# Patient Record
Sex: MALE | Race: WHITE | Employment: OTHER | URBAN - METROPOLITAN AREA
[De-identification: names, ages, dates, MRNs, and addresses within clinical notes are randomized per-mention and may not be internally consistent; named-entity substitution may affect disease eponyms.]

---

## 2017-03-10 ENCOUNTER — OFFICE VISIT (OUTPATIENT)
Dept: NEUROLOGY | Age: 72
End: 2017-03-10

## 2017-03-10 VITALS
HEIGHT: 68 IN | WEIGHT: 199 LBS | DIASTOLIC BLOOD PRESSURE: 60 MMHG | OXYGEN SATURATION: 95 % | SYSTOLIC BLOOD PRESSURE: 110 MMHG | BODY MASS INDEX: 30.16 KG/M2 | HEART RATE: 78 BPM

## 2017-03-10 NOTE — PATIENT INSTRUCTIONS
When the  calls, let them know that she would like to have your EEG done at the [facility] IN THE \[facility]\"" neurology office    If it is abnormal, we will call to discuss starting medication      [facility]   Statement to Patients  April 1, 2014      In an effort to ensure the large volume of patient prescription refills is processed in the most efficient and expeditious manner, we are asking our patients to assist us by calling your Pharmacy for all prescription refills, this will include also your  Mail Order Pharmacy. The pharmacy will contact our office electronically to continue the refill process. Please do not wait until the last minute to call your pharmacy. We need at least 48 hours (2days) to fill prescriptions. We also encourage you to call your pharmacy before going to  your prescription to make sure it is ready. With regard to controlled substance prescription refill requests (narcotic refills) that need to be picked up at our office, we ask your cooperation by providing us with at least 72 hours (3days) notice that you will need a refill. We will not refill narcotic prescription refill requests after 4:00pm on any weekday, Monday through Thursday, or after 2:00pm on Fridays, or on the weekends. We encourage everyone to explore another way of getting your prescription refill request processed using GradeFund, our patient web portal through our electronic medical record system. GradeFund is an efficient and effective way to communicate your medication request directly to the office and  downloadable as an sherman on your smart phone . GradeFund also features a review functionality that allows you to view your medication list as well as leave messages for your physician. Are you ready to get connected? If so please review the attatched instructions or speak to any of our staff to get you set up right away! Thank you so much for your cooperation.  Should you have any questions please contact our Practice Administrator. The Physicians and Staff,  [facility]          A Healthy Lifestyle: Care Instructions  Your Care Instructions  A healthy lifestyle can help you feel good, stay at a healthy weight, and have plenty of energy for both work and play. A healthy lifestyle is something you can share with your whole family. A healthy lifestyle also can lower your risk for serious health problems, such as high blood pressure, heart disease, and diabetes. You can follow a few steps listed below to improve your health and the health of your family. Follow-up care is a key part of your treatment and safety. Be sure to make and go to all appointments, and call your doctor if you are having problems. Its also a good idea to know your test results and keep a list of the medicines you take. How can you care for yourself at home? · Do not eat too much sugar, fat, or fast foods. You can still have dessert and treats now and then. The goal is moderation. · Start small to improve your eating habits. Pay attention to portion sizes, drink less juice and soda pop, and eat more fruits and vegetables. ¨ Eat a healthy amount of food. A 3-ounce serving of meat, for example, is about the size of a deck of cards. Fill the rest of your plate with vegetables and whole grains. ¨ Limit the amount of soda and sports drinks you have every day. Drink more water when you are thirsty. ¨ Eat at least 5 servings of fruits and vegetables every day. It may seem like a lot, but it is not hard to reach this goal. A serving or helping is 1 piece of fruit, 1 cup of vegetables, or 2 cups of leafy, raw vegetables. Have an apple or some carrot sticks as an afternoon snack instead of a candy bar. Try to have fruits and/or vegetables at every meal.  · Make exercise part of your daily routine. You may want to start with simple activities, such as walking, bicycling, or slow swimming.  Try to be active 30 to 60 minutes every day. You do not need to do all 30 to 60 minutes all at once. For example, you can exercise 3 times a day for 10 or 20 minutes. Moderate exercise is safe for most people, but it is always a good idea to talk to your doctor before starting an exercise program.  · Keep moving. Boyd Gut the lawn, work in the garden, or [place]. Take the stairs instead of the elevator at work. · If you smoke, quit. People who smoke have an increased risk for heart attack, stroke, cancer, and other lung illnesses. Quitting is hard, but there are ways to boost your chance of quitting tobacco for good. ¨ Use nicotine gum, patches, or lozenges. ¨ Ask your doctor about stop-smoking programs and medicines. ¨ Keep trying. In addition to reducing your risk of diseases in the future, you will notice some benefits soon after you stop using tobacco. If you have shortness of breath or asthma symptoms, they will likely get better within a few weeks after you quit. · Limit how much alcohol you drink. Moderate amounts of alcohol (up to 2 drinks a day for men, 1 drink a day for women) are okay. But drinking too much can lead to liver problems, high blood pressure, and other health problems. Family health  If you have a family, there are many things you can do together to improve your health. · Eat meals together as a family as often as possible. · Eat healthy foods. This includes fruits, vegetables, lean meats and dairy, and whole grains. · Include your family in your fitness plan. Most people think of activities such as jogging or tennis as the way to fitness, but there are many ways you and your family can be more active. Anything that makes you breathe hard and gets your heart pumping is exercise. Here are some tips:  ¨ Walk to do errands or to take your child to school or the bus. ¨ Go for a family bike ride after dinner instead of watching TV. Where can you learn more?   Go to http://randall-cy.info/. Enter M474 in the search box to learn more about \"A Healthy Lifestyle: Care Instructions. \"  Current as of: July 26, 2016  Content Version: 11.1  © 9498-5177 [employer]. Care instructions adapted under license by [employer] (which disclaims liability or warranty for this information). If you have questions about a medical condition or this instruction, always ask your healthcare professional. [place] [address] any warranty or liability for your use of this information.

## 2017-03-10 NOTE — PROGRESS NOTES
Date:            March 10, 2017    Name:  Sachin Nieto  :  1945  MRN:  [id number]     PCP:  No primary care provider on file. Chief Complaint   Patient presents with    Follow-up         HISTORY OF PRESENT ILLNESS:  Guille Diaz is a 66 y.o., male who presents today for follow up for indigestion. He developed significant GI symptoms in the late 90s. He reports that he woke up and was aware of his arms and legs in a way that he was not usually. This is always the first stage of his episodes, then his stomach will get sour. No vomiting, no diarrhea. Sometimes it goes up to his throat. Saw a GI doctor, who diagnosed him with IBS. Saw some other specialists who did not have an explanation. Saw an , that treatment did not help. He did some testing on himself, ultimately decided that it was brain related. He did notice that eye strain from a long drive or computer use could exacerbate his sour stomach. PCP put him on Cymbalta, spells were controlled until he had another one recently, about a month ago. It seemed to be right after he had a sinus infection. His stomach got very sour, this lasted about 3-4 days and then he went back to normal. He used to have similar symptoms related to over-exertion in the heat. No h/o head injuries, car accident, seizures. Patient denies depression or anxiety, although his wife feels that he might have some anxiety. Comprehensive ROS negative except as noted in HPI. 6.10.2015 shiva Irvin is a 68 y.o. male who is here today for the evaluation of above complaints. In  he had some paresthesia in limbs that was progressive and would come and go. He had some weird feeling in stomach too. He had extensive testing for GI and was told to have IBS. He spent a year with herbal medications which didn't help. He was given cymbalta and it did help him feel better. He also takes xanax and has not been able to stop it.  His symptoms of stomach weird feelings come back with more aggression. He has no other neurological issues to address. Current Outpatient Prescriptions   Medication Sig    DULoxetine (CYMBALTA) 30 mg capsule Take 60 mg by mouth daily. Takes 2 tabs at night     ALPRAZolam (XANAX) 0.5 mg tablet Take 0.5 mg by mouth. Takes 1 and 1/2 tabs a day    multivitamin (ONE A DAY) tablet Take 1 Tab by mouth daily.  lutein 6 mg tab Take  by mouth. No current facility-administered medications for this visit. Allergies not on file  Past Medical History:   Diagnosis Date    Vision decreased      Past Surgical History:   Procedure Laterality Date    HX GI      HX GYN       Social History     Social History    Marital status:      Spouse name: N/A    Number of children: N/A    Years of education: N/A     Occupational History    Not on file. Social History Main Topics    Smoking status: Never Smoker    Smokeless tobacco: Not on file    Alcohol use No    Drug use: No    Sexual activity: Not on file     Other Topics Concern    Not on file     Social History Narrative    No narrative on file     Family History   Problem Relation Age of Onset    Stroke Mother     No Known Problems Father          PHYSICAL EXAMINATION:    Visit Vitals    /60    Pulse 78    Ht 5' 8\" (1.727 m)    Wt 199 lb (90.3 kg)    SpO2 95%    BMI 30.26 kg/m2     General:  Well defined, nourished, and groomed individual in no acute distress. Neck: Supple, nontender, no bruits, no pain with resistance to active range of motion. Heart: Regular rate and rhythm, no murmurs, rub, or gallop. Normal S1S2. Lungs:  Clear to auscultation bilaterally with equal chest expansion, no cough, no wheeze  Musculoskeletal:  Extremities revealed no edema and had full range of motion of joints.     Psych:  Good mood and bright affect    NEUROLOGICAL EXAMINATION:     Mental Status:   Alert and oriented to person, place, and time with recent and remote memory intact. Attention span and concentration are normal. Speech is fluent with a full fund of knowledge. Cranial Nerves:    II, III, IV, VI:  Visual acuity grossly intact. Pupils are equal, round, and reactive to light. Extra-ocular movements are full and fluid. No ptosis or nystagmus. V-XII: Hearing is grossly intact. Facial features are symmetric, with normal sensation and strength. The palate rises symmetrically and the tongue protrudes midline. Sternocleidomastoids 5/5. Motor Examination: Normal tone, bulk, and strength, 5/5 muscle strength throughout. Coordination:  Finger to nose testing was normal.   No resting or intention tremor  Gait and Station:  Steady while walking and with tandem walking. Normal arm swing. No pronator drift. No muscle wasting or fasciculations noted. ASSESSMENT AND PLAN    ICD-10-CM ICD-9-CM    1. Spells R68.89 780.99 EEG     15-year-old male seen in follow-up for spells of odd feeling with indigestion. He first developed these in the late 90s, reports that he has a sour stomach and has an odd sensation in his arms and legs. Saw GI specialist who told him he had IBS. Saw an herbalist without improvement. Never did find the cause, but it has been well controlled on Cymbalta since 1999. He is concerned that he recently had another spell, wants to know if there is a newer medication may be better. Denies history of head injuries, strokes, seizures. Case discussed with Dr. Aden Vidal who agrees with the plan of care. 1.  We will get routine EEG to rule out the possibility that his spells are caused by seizure activity, discussed with him that we would likely want to start an AED if abnormal.  He can call for those results   2. Discussed with patient that the most likely explanation for his spells is somatization, encouraged him to follow with his PCP for other treatment options if Cymbalta is no longer working.    3.  May also need to see GI again to rule out other causes of his indigestion    Follow-up as needed, call for EEG results    Juliane Bar NP

## 2017-03-10 NOTE — MR AVS SNAPSHOT
Visit Information Date & Time Provider Department Dept. Phone Encounter #  
 3/10/2017  3:00 PM Elisabeth Carey NP Neurology Clinic at [facility] [phone] [id number] Follow-up Instructions Return if symptoms worsen or fail to improve. Upcoming Health Maintenance Date Due DTaP/Tdap/Td series (1 - Tdap) 9/25/1964 FOBT Q 1 YEAR AGE 50-75 9/25/1993 ZOSTER VACCINE AGE 60> 9/25/2003 GLAUCOMA SCREENING Q2Y 9/25/2008 Pneumococcal 65+ Low/Medium Risk (1 of 2 - PCV13) 9/25/2008 MEDICARE YEARLY EXAM 9/25/2008 INFLUENZA AGE 9 TO ADULT 8/1/2016 Allergies as of 3/10/2017  Review Complete On: 3/10/2017 By: Hiro Barry LPN No Known Allergies Current Immunizations  Never Reviewed No immunizations on file. Not reviewed this visit You Were Diagnosed With   
  
 Codes Comments Spells    -  Primary ICD-10-CM: R68.89 ICD-9-CM: 780.99 Vitals BP Pulse Height(growth percentile) Weight(growth percentile) SpO2 BMI  
 110/60 78 5' 8\" (1.727 m) 199 lb (90.3 kg) 95% 30.26 kg/m2 Smoking Status Never Smoker Vitals History BMI and BSA Data Body Mass Index Body Surface Area  
 30.26 kg/m 2 2.08 m 2 Your Updated Medication List  
  
   
This list is accurate as of: 3/10/17  3:53 PM.  Always use your most recent med list.  
  
  
  
  
 ALPRAZolam 0.5 mg tablet Commonly known as:  Ace Sa Take 0.5 mg by mouth. Takes 1 and 1/2 tabs a day CYMBALTA 30 mg capsule Generic drug:  DULoxetine Take 60 mg by mouth daily. Takes 2 tabs at night  
  
 lutein 6 mg Tab Take  by mouth.  
  
 multivitamin tablet Commonly known as:  ONE A DAY Take 1 Tab by mouth daily. Follow-up Instructions Return if symptoms worsen or fail to improve. To-Do List   
 03/11/2017 Neurology:  EEG Patient Instructions When the  calls, let them know that she would like to have your EEG done at the [facility] IN THE \[facility]\"" neurology office If it is abnormal, we will call to discuss starting medication PRESCRIPTION REFILL POLICY [facility] Statement to Patients April 1, 2014 In an effort to ensure the large volume of patient prescription refills is processed in the most efficient and expeditious manner, we are asking our patients to assist us by calling your Pharmacy for all prescription refills, this will include also your  Mail Order Pharmacy. The pharmacy will contact our office electronically to continue the refill process. Please do not wait until the last minute to call your pharmacy. We need at least 48 hours (2days) to fill prescriptions. We also encourage you to call your pharmacy before going to  your prescription to make sure it is ready. With regard to controlled substance prescription refill requests (narcotic refills) that need to be picked up at our office, we ask your cooperation by providing us with at least 72 hours (3days) notice that you will need a refill. We will not refill narcotic prescription refill requests after 4:00pm on any weekday, Monday through Thursday, or after 2:00pm on Fridays, or on the weekends. We encourage everyone to explore another way of getting your prescription refill request processed using Hers, our patient web portal through our electronic medical record system. Hers is an efficient and effective way to communicate your medication request directly to the office and  downloadable as an sherman on your smart phone . Hers also features a review functionality that allows you to view your medication list as well as leave messages for your physician. Are you ready to get connected? If so please review the attatched instructions or speak to any of our staff to get you set up right away! Thank you so much for your cooperation. Should you have any questions please contact our Practice Administrator. The Physicians and Staff,  [facility] A Healthy Lifestyle: Care Instructions Your Care Instructions A healthy lifestyle can help you feel good, stay at a healthy weight, and have plenty of energy for both work and play. A healthy lifestyle is something you can share with your whole family. A healthy lifestyle also can lower your risk for serious health problems, such as high blood pressure, heart disease, and diabetes. You can follow a few steps listed below to improve your health and the health of your family. Follow-up care is a key part of your treatment and safety. Be sure to make and go to all appointments, and call your doctor if you are having problems. Its also a good idea to know your test results and keep a list of the medicines you take. How can you care for yourself at home? · Do not eat too much sugar, fat, or fast foods. You can still have dessert and treats now and then. The goal is moderation. · Start small to improve your eating habits. Pay attention to portion sizes, drink less juice and soda pop, and eat more fruits and vegetables. ¨ Eat a healthy amount of food. A 3-ounce serving of meat, for example, is about the size of a deck of cards. Fill the rest of your plate with vegetables and whole grains. ¨ Limit the amount of soda and sports drinks you have every day. Drink more water when you are thirsty. ¨ Eat at least 5 servings of fruits and vegetables every day. It may seem like a lot, but it is not hard to reach this goal. A serving or helping is 1 piece of fruit, 1 cup of vegetables, or 2 cups of leafy, raw vegetables. Have an apple or some carrot sticks as an afternoon snack instead of a candy bar. Try to have fruits and/or vegetables at every meal. 
· Make exercise part of your daily routine.  You may want to start with simple activities, such as walking, bicycling, or slow swimming. Try to be active 30 to 60 minutes every day. You do not need to do all 30 to 60 minutes all at once. For example, you can exercise 3 times a day for 10 or 20 minutes. Moderate exercise is safe for most people, but it is always a good idea to talk to your doctor before starting an exercise program. 
· Keep moving. Carlos Cluck the lawn, work in the garden, or [place]. Take the stairs instead of the elevator at work. · If you smoke, quit. People who smoke have an increased risk for heart attack, stroke, cancer, and other lung illnesses. Quitting is hard, but there are ways to boost your chance of quitting tobacco for good. ¨ Use nicotine gum, patches, or lozenges. ¨ Ask your doctor about stop-smoking programs and medicines. ¨ Keep trying. In addition to reducing your risk of diseases in the future, you will notice some benefits soon after you stop using tobacco. If you have shortness of breath or asthma symptoms, they will likely get better within a few weeks after you quit. · Limit how much alcohol you drink. Moderate amounts of alcohol (up to 2 drinks a day for men, 1 drink a day for women) are okay. But drinking too much can lead to liver problems, high blood pressure, and other health problems. Family health If you have a family, there are many things you can do together to improve your health. · Eat meals together as a family as often as possible. · Eat healthy foods. This includes fruits, vegetables, lean meats and dairy, and whole grains. · Include your family in your fitness plan. Most people think of activities such as jogging or tennis as the way to fitness, but there are many ways you and your family can be more active. Anything that makes you breathe hard and gets your heart pumping is exercise. Here are some tips: 
¨ Walk to do errands or to take your child to school or the bus. ¨ Go for a family bike ride after dinner instead of watching TV. Where can you learn more? Go to http://randall-cy.info/. Enter F428 in the search box to learn more about \"A Healthy Lifestyle: Care Instructions. \" Current as of: July 26, 2016 Content Version: 11.1 © 6453-3984 [employer]X. Care instructions adapted under license by [employer] (which disclaims liability or warranty for this information). If you have questions about a medical condition or this instruction, always ask your healthcare professional. [address] any warranty or liability for your use of this information. Introducing [facility]! Ap Guido introduces Playground Sessions patient portal. Now you can access parts of your medical record, email your doctor's office, and request medication refills online. 1. In your internet browser, go to [email or web address] 2. Click on the First Time User? Click Here link in the Sign In box. You will see the New Member Sign Up page. 3. Enter your Playground Sessions Access Code exactly as it appears below. You will not need to use this code after youve completed the sign-up process. If you do not sign up before the expiration date, you must request a new code. · Playground Sessions Access Code: [email or web address] Expires: 6/8/2017  3:00 PM 
 
4. Enter the last four digits of your Social Security Number (xxxx) and Date of Birth (mm/dd/yyyy) as indicated and click Submit. You will be taken to the next sign-up page. 5. Create a Aylus Networkst ID. This will be your Playground Sessions login ID and cannot be changed, so think of one that is secure and easy to remember. 6. Create a Playground Sessions password. You can change your password at any time. 7. Enter your Password Reset Question and Answer. This can be used at a later time if you forget your password. 8. Enter your e-mail address.  You will receive e-mail notification when new information is available in Friendshippr. 9. Click Sign Up. You can now view and download portions of your medical record. 10. Click the Download Summary menu link to download a portable copy of your medical information. If you have questions, please visit the Frequently Asked Questions section of the Friendshippr website. Remember, Friendshippr is NOT to be used for urgent needs. For medical emergencies, dial 911. Now available from your iPhone and Android! Please provide this summary of care documentation to your next provider. If you have any questions after today's visit, please call [phone].

## 2017-03-21 ENCOUNTER — HOSPITAL ENCOUNTER (OUTPATIENT)
Dept: NEUROLOGY | Age: 72
Discharge: HOME | End: 2017-03-21
Attending: NURSE PRACTITIONER
Payer: MEDICARE

## 2017-03-21 DIAGNOSIS — R56.9 CONVULSIONS, UNSPECIFIED CONVULSION TYPE (HCC): ICD-10-CM

## 2017-03-21 DIAGNOSIS — R41.82 ALTERED MENTAL STATUS, UNSPECIFIED: ICD-10-CM

## 2017-03-21 PROCEDURE — 95816 EEG AWAKE AND DROWSY: CPT

## 2017-05-10 ENCOUNTER — TELEPHONE (OUTPATIENT)
Dept: NEUROLOGY | Age: 72
End: 2017-05-10

## 2017-05-10 NOTE — TELEPHONE ENCOUNTER
----- Message from Agustín Mendoza sent at 5/10/2017  9:37 AM EDT -----  Regarding: SAMUEL Maher/Telephone  Pt would like the results from the EEG. Best contact number is [phone].

## 2017-05-16 NOTE — TELEPHONE ENCOUNTER
Patient called back wondering if we figured out where he did his eeg testing. Expressed that we have no idea as there is nothing in his chart that suggests he had it done and per the last conversation with the nurse, he was supposed to figure out where he went and let us know. He still insists that he doesn't know. Wants to know what to do now. Gave him the number to the  dept in hopes that they will be able to point him in the right direction. Patient wants a call back.

## 2017-05-17 ENCOUNTER — TELEPHONE (OUTPATIENT)
Dept: NEUROLOGY | Age: 72
End: 2017-05-17

## 2017-05-17 PROBLEM — R41.82 ALTERED MENTAL STATUS, UNSPECIFIED: Status: ACTIVE | Noted: 2017-05-17

## 2017-05-17 PROBLEM — R56.9 CONVULSION (HCC): Status: ACTIVE | Noted: 2017-05-17

## 2017-05-17 NOTE — TELEPHONE ENCOUNTER
----- Message from Rachel Nieves sent at 5/16/2017 12:05 PM EDT -----  Regarding: SAMUEL Maher/Telephone  Pt, (P) [phone], advised that he had his EEG done at [address] [place] [address] and is inquiring on the results.

## 2017-05-17 NOTE — PROCEDURES
Yusuf Cohen [facility] [place] [facility]   [address] [facility], [address]   ROUTINE EEG REPORT       Name:  Shea Garcia   MR#:  [id number]   :  1945   Account #:  [de-identified]    Date of Procedure:     Date of Adm:  2017       DATE OF SERVICE: 2017      CLINICAL INDICATION: The patient is a 76-year-old male with a   history of spells of sudden abnormal abdominal discomfort then altered   mental status, slight episodes of not feeling well in his arms and legs. EEG to rule out seizures, rule out cortical abnormality. EEG CLASSIFICATION: Normal awake and drowsy. DESCRIPTION OF THE RECORD: The background of this recording   contains a posteriorly located occipital alpha rhythm of 9 to 10 Hz that   attenuates some with eye opening. Throughout the recording, there   were no clear areas of focal slowing or spike or spike-and-wave   discharges seen. The patient did enter brief states of drowsiness with   some generalized 2-6 Hz activity seen, but no clear areas of focal   slowing or spike or spike-and-wave discharges were seen. The patient   had photic stimulation performed that produced a mild driving response   in the posterior head regions. Hyperventilation was not performed. INTERPRETATION: This is an essentially normal   electroencephalogram with the patient awake and drowsy, showing no   clear focal abnormalities, spike discharges or recorded spells. Clinical   correlation recommended.         Mendel Feathers, MD TAS / Ashley.David   D:  2017   07:57   T:  2017   09:33   Job #:  [id number]

## 2022-03-19 PROBLEM — R56.9 CONVULSION (HCC): Status: ACTIVE | Noted: 2017-05-17

## 2022-03-20 PROBLEM — R41.82 ALTERED MENTAL STATUS, UNSPECIFIED: Status: ACTIVE | Noted: 2017-05-17

## 2022-05-26 ENCOUNTER — OFFICE VISIT (OUTPATIENT)
Dept: NEUROLOGY | Age: 77
End: 2022-05-26
Payer: MEDICARE

## 2022-05-26 VITALS
DIASTOLIC BLOOD PRESSURE: 64 MMHG | RESPIRATION RATE: 15 BRPM | HEART RATE: 77 BPM | OXYGEN SATURATION: 95 % | SYSTOLIC BLOOD PRESSURE: 112 MMHG

## 2022-05-26 DIAGNOSIS — R29.818 TRANSIENT NEUROLOGICAL SYMPTOMS: Primary | ICD-10-CM

## 2022-05-26 PROCEDURE — G8427 DOCREV CUR MEDS BY ELIG CLIN: HCPCS | Performed by: SPECIALIST

## 2022-05-26 PROCEDURE — 1101F PT FALLS ASSESS-DOCD LE1/YR: CPT | Performed by: SPECIALIST

## 2022-05-26 PROCEDURE — G8421 BMI NOT CALCULATED: HCPCS | Performed by: SPECIALIST

## 2022-05-26 PROCEDURE — G8536 NO DOC ELDER MAL SCRN: HCPCS | Performed by: SPECIALIST

## 2022-05-26 PROCEDURE — G8432 DEP SCR NOT DOC, RNG: HCPCS | Performed by: SPECIALIST

## 2022-05-26 PROCEDURE — 1123F ACP DISCUSS/DSCN MKR DOCD: CPT | Performed by: SPECIALIST

## 2022-05-26 PROCEDURE — 99204 OFFICE O/P NEW MOD 45 MIN: CPT | Performed by: SPECIALIST

## 2022-05-26 NOTE — PROGRESS NOTES
Neurology Consult      Subjective:      Amira Avalos is a 64 y.o. male who comes in today with what he called a 25-year problem. Said on July 26, 1995 he had this uncanny awareness about his arms and legs. This was followed by an uncomfortable [place] stomach feeling that would be there 24 hours a day. Said coincidentally and spontaneously that appetite and bowel movement were okay. He would also end up with his decreased awareness of his throat mechanics. Also found with driving long distances and computer work things would eventually settle down. He felt somewhat improved and saw a  in his encounters. Found that the drug Cymbalta and Xanax at the suggestion seem to make him feel better. Would notice that as he pursued activity his symptoms would get better and if he stopped being physically minded that his symptom complex will get worse? Currently notices that with his eyes and visual attention with driving and computer he can ultimately feel what he calls puny or fatigued. Says it can persist until he sleeps it off and by the next day he is okay. Is happy with his primary care and he has a trusted eye doctor at the [facility].  I see in the procedure tab that on March 21, 2017 he had an EEG read by Dr. Chirag Shelley awake and drowsy as normal and otherwise reassured. Suggested follow-up as needed. He says that at his current age he hopes he has plenty of years left and quality years at that. I see no reason to believe otherwise. Current Outpatient Medications   Medication Sig Dispense Refill    DULoxetine (CYMBALTA) 30 mg capsule Take 60 mg by mouth daily. Takes 2 tabs at night       ALPRAZolam (XANAX) 0.5 mg tablet Take 0.5 mg by mouth. Takes 1 and 1/2 tabs a day      multivitamin (ONE A DAY) tablet Take 1 Tab by mouth daily.  lutein 6 mg tab Take  by mouth.         No Known Allergies  Past Medical History:   Diagnosis Date    Vision decreased Past Surgical History:   Procedure Laterality Date    HX GI      HX GYN        Social History     Socioeconomic History    Marital status:      Spouse name: Not on file    Number of children: Not on file    Years of education: Not on file    Highest education level: Not on file   Occupational History    Not on file   Tobacco Use    Smoking status: Never Smoker    Smokeless tobacco: Not on file   Substance and Sexual Activity    Alcohol use: No    Drug use: No    Sexual activity: Not on file   Other Topics Concern    Not on file   Social History Narrative    Not on file     Social Determinants of Health     Financial Resource Strain:     Difficulty of Paying Living Expenses: Not on file   Food Insecurity:     Worried About Running Out of Food in the Last Year: Not on file    Tierra of Food in the Last Year: Not on file   Transportation Needs:     Lack of Transportation (Medical): Not on file    Lack of Transportation (Non-Medical):  Not on file   Physical Activity:     Days of Exercise per Week: Not on file    Minutes of Exercise per Session: Not on file   Stress:     Feeling of Stress : Not on file   Social Connections:     Frequency of Communication with Friends and Family: Not on file    Frequency of Social Gatherings with Friends and Family: Not on file    Attends Zoroastrianism Services: Not on file    Active Member of [address] [place] [address] Car reviews or Organizations: Not on file    Attends Club or Organization Meetings: Not on file    Marital Status: Not on file   Intimate Partner Violence:     Fear of Current or Ex-Partner: Not on file    Emotionally Abused: Not on file    Physically Abused: Not on file    Sexually Abused: Not on file   Housing Stability:     Unable to Pay for Housing in the Last Year: Not on file    Number of [place] in the Last Year: Not on file    Unstable Housing in the Last Year: Not on file      Family History   Problem Relation Age of Onset    Stroke Mother     No Known Problems Father       Visit Vitals  /64 (BP 1 Location: Left arm, BP Patient Position: Sitting, BP Cuff Size: Adult)   Pulse 77   Resp 15   SpO2 95%        Review of Systems:   A comprehensive review of systems was negative except for that written in the HPI. Neuro Exam:     Appearance: The patient is well developed, well nourished, provides a coherent history and is in no acute distress. Mental Status: Oriented to time, place and person. Mood and affect appropriate. Cranial Nerves:   Intact visual fields to static hand confrontation. Fundi are benign. OZIEL, EOM's full including smooth pursuit saccades vergence vestibulo-ocular and opticokinetic, no nystagmus, no ptosis. Facial sensation is normal. Corneal reflexes are intact. Facial movement is symmetric. Hearing is normal bilaterally. Palate is midline with normal sternocleidomastoid and trapezius muscles are normal. Tongue is midline. Visual acuity near with correction 20/20 OU. APD negative. Motor:  5/5 strength in upper and lower proximal and distal muscles. Normal bulk and tone. No fasciculations. Reflexes:   Deep tendon reflexes 2+/4 and symmetrical.   Sensory:   Normal to touch, pinprick and vibration. Gait:  Normal gait. Tremor:   No tremor noted. Cerebellar:  No cerebellar signs present. Neurovascular:  Normal heart sounds and regular rhythm, peripheral pulses intact, and no carotid bruits. Assessment:   Transient neurologic symptoms. Exam looks good and I have no paranoia or second-guessing regarding anything that we have missed with the dialogue today. For that reason I will not arbitrarily order tests, just because they exist.  I will suggest he keep his manfred in his primary care in the [address] [place] [address] as it goes to his eye care etc.  Been a pleasure meeting this gentleman today and I made no new discoveries or concerns that need to be pursued or followed up as of this encounter.     Plan:   Good luck and have a great Memorial Day weekend.   Signed by :  Jaydon Byrd MD

## 2022-05-26 NOTE — LETTER
5/26/2022    Patient: Wayne Pickering   YOB: 1945   Date of Visit: 5/26/2022     Cj Sinha MD  [address] [place] [id number]  Via Fax: [phone]    Dear Cj Sinha MD,      Thank you for referring Mr. Wayne Pickering to [address] [place] [address] for evaluation. My notes for this consultation are attached. If you have questions, please do not hesitate to call me. I look forward to following your patient along with you.       Sincerely,    Oren Jj MD

## 2022-05-26 NOTE — PATIENT INSTRUCTIONS
Patient history obtained patient examined. I hope that the patient feels reassured that I do not sense any professional concerns based on the shared information today. Continue to follow-up with Dr. Joel Weber and maintain that very important trusting follow-up with the eye doctor at the [facility].  I cannot think of any testing that I need to accomplish based on what I have seen in her today. Thanks for your service in the [place] [address] and have a great Memorial Day weekend and good luck.

## 2022-05-26 NOTE — PROGRESS NOTES
Issue started about 25 years ago  Eyes trigger something makes him feel under the weather   Double vision  Unsure if he has seen ophthalmologist

## 2022-10-10 ENCOUNTER — HOSPITAL ENCOUNTER (OUTPATIENT)
Age: 77
Setting detail: OUTPATIENT SURGERY
Discharge: HOME | End: 2022-10-10
Attending: INTERNAL MEDICINE | Admitting: INTERNAL MEDICINE
Payer: MEDICARE

## 2022-10-10 VITALS
OXYGEN SATURATION: 97 % | HEART RATE: 70 BPM | HEIGHT: 68 IN | SYSTOLIC BLOOD PRESSURE: 170 MMHG | BODY MASS INDEX: 30.26 KG/M2 | RESPIRATION RATE: 18 BRPM | DIASTOLIC BLOOD PRESSURE: 72 MMHG

## 2022-10-10 PROCEDURE — 2709999900 HC NON-CHARGEABLE SUPPLY: Performed by: INTERNAL MEDICINE

## 2022-10-10 PROCEDURE — 77030040810 HC CATH BLLN ANORECT EXPULSN MUIS -B: Performed by: INTERNAL MEDICINE

## 2022-10-10 PROCEDURE — 76040000007: Performed by: INTERNAL MEDICINE

## 2022-10-10 RX ORDER — ROSUVASTATIN CALCIUM 20 MG/1
20 TABLET, COATED ORAL
COMMUNITY

## 2022-10-10 NOTE — PROGRESS NOTES
Rectal exam done by Mane Soriano LPN. Anal manometry catheter inserted into rectum. Manometry procedure complete. Catheter inserted into rectum. Balloon filled with 40cc of luke warm H2O, and pt escorted to bathroom for 5 min expulsion test.  Pt was not able to expel balloon. Balloon deflated and catheter removed. Pt tolerated procedures well.

## 2022-10-10 NOTE — DISCHARGE INSTRUCTIONS
Nidhi Ambriz  [id number]  1945      MANOMETRY DISCHARGE INSTRUCTION    You may resume your regular diet as tolerated. You may resume your normal daily activities. Call your Physician if you have any complications or questions. People to Remember Activation    Thank you for requesting access to People to Remember. Please follow the instructions below to securely access and download your online medical record. People to Remember allows you to send messages to your doctor, view your test results, renew your prescriptions, schedule appointments, and more. How Do I Sign Up? In your internet browser, go to [email or web address]  Click on the First Time User? Click Here link in the Sign In box. You will be redirect to the New Member Sign Up page. Enter your People to Remember Access Code exactly as it appears below. You will not need to use this code after youve completed the sign-up process. If you do not sign up before the expiration date, you must request a new code. People to Remember Access Code: [email or web address]  Expires: 11/3/2022 11:02 AM (This is the date your People to Remember access code will )    Enter the last four digits of your Social Security Number (xxxx) and Date of Birth (mm/dd/yyyy) as indicated and click Submit. You will be taken to the next sign-up page. Create a People to Remember ID. This will be your People to Remember login ID and cannot be changed, so think of one that is secure and easy to remember. Create a People to Remember password. You can change your password at any time. Enter your Password Reset Question and Answer. This can be used at a later time if you forget your password. Enter your e-mail address. You will receive e-mail notification when new information is available in [address] Click Sign Up. You can now view and download portions of your medical record. Click the [employer] link to download a portable copy of your medical information.     Additional Information    If you have questions, please visit the Frequently Asked Questions section of the [employer] website at [email or web address] Remember, [employer] is NOT to be used for urgent needs. For medical emergencies, dial 911.

## 2022-10-24 NOTE — PROCEDURES
Male Anorectal Manometry Procedure Note     Procedure Date: 10/10/22     Referring Provider: DR Tali Baker     Operation/Procedure: Anorectal Manometry/Rectal Sensation/Tone     Indication: Stool incontinence, Hemorrhoidectomy, Chronic constipation     Description of the Operation/Procedure:A 12-sensor high resolution solid state manometry probe with a 4cm long balloon was placed through the anus into the rectum. When correctly positioned, the pressure sensors were located 1cm apart from the anal margin and the balloon at 7-11 cm. After correct placement, the probe was taped to the perineum. After a run in period of 5 minutes, the patient was asked to perform anal squeeze maneuvers twice, and bearing down maneuvers. Thereafter, intermittent rectal balloon distention was performed to assess rectoanal reflexes, rectal sensation, and rectal compliance by distending the balloon in a step wise manner. After this, the probe was removed. We then placed a 50cc water filled balloon in the rectum and the patient was asked to expel this device in privacy on a commode. Findings:  (Normal mean and 95% Confidence Interval shown in parenthesis)       Anal Sphincter Pressures:  Maximum resting pressure    71.9 mmHg (72, 64-80)   Maximum squeeze pressure    228 mmHg (193, 175-211)   Duration of squeeze    9.2 seconds   Straining maneuver rectal pressure    35.6 mmHg (68, 58-78),   Straining maneuver anal residual pressure    63.7 mmHg (49, 35-63)   Defecation index    0.55 (2.2, 1.4-3.0)   Sphincter length    3.6 cm (4, 3.8-4.2)   Dyssynergic defecation    Yes         Rectoanal Reflexes: The rectoanal inhibitory reflex was:  Present (on initial balloon fill)         Rectal Sensation: The patient reported  First sensation    60 cc (20, 15-25)   Desire to defecate    120 cc (109, )   Urgency    200 cc (186, 152-218)          Balloon Expulsion Test:  Able to expel a 40cc balloon in 5 minute?     No      Impression: According to the 2019 [facility] Classifications and the [address] [place] [address] Manometric Tests of Anorectal Function in Healthy Adults:  1) There is no manometric evidence to support rectoanal hyporeflexia or areflexia. 2) There is borderline normal resting tone and borderline elevated contractility of the anal sphincter, of likely low clinical significance. 3) There is manometric evidence to support dyssynergic defecation; there is abnormally low intrarectal pressure in the presence of abnormal balloon expulsion. Anal relaxation is borderline inadequate, therefore push maneuvers indicate abnormal expulsion with poor propulsion and dyssynergia. 4) There is equivocal manometric evidence to support rectal hyposensitivity. Recommendation:   1) Recommend pelvic Floor PT with biofeedback therapy for defecatory dyssynergia.

## 2023-01-10 ENCOUNTER — HOSPITAL ENCOUNTER (OUTPATIENT)
Age: 78
Setting detail: OUTPATIENT SURGERY
Discharge: HOME | End: 2023-01-10
Attending: INTERNAL MEDICINE | Admitting: INTERNAL MEDICINE
Payer: MEDICARE

## 2023-01-10 ENCOUNTER — ANESTHESIA (OUTPATIENT)
Dept: ENDOSCOPY | Age: 78
End: 2023-01-10
Payer: MEDICARE

## 2023-01-10 ENCOUNTER — ANESTHESIA EVENT (OUTPATIENT)
Dept: ENDOSCOPY | Age: 78
End: 2023-01-10
Payer: MEDICARE

## 2023-01-10 VITALS
RESPIRATION RATE: 15 BRPM | OXYGEN SATURATION: 97 % | HEIGHT: 68 IN | HEART RATE: 67 BPM | BODY MASS INDEX: 28.49 KG/M2 | SYSTOLIC BLOOD PRESSURE: 139 MMHG | TEMPERATURE: 98 F | WEIGHT: 188 LBS | DIASTOLIC BLOOD PRESSURE: 67 MMHG

## 2023-01-10 PROCEDURE — 74011250636 HC RX REV CODE- 250/636: Performed by: INTERNAL MEDICINE

## 2023-01-10 PROCEDURE — 74011000250 HC RX REV CODE- 250: Performed by: ANESTHESIOLOGY

## 2023-01-10 PROCEDURE — 74011250636 HC RX REV CODE- 250/636: Performed by: ANESTHESIOLOGY

## 2023-01-10 PROCEDURE — 76060000032 HC ANESTHESIA 0.5 TO 1 HR: Performed by: INTERNAL MEDICINE

## 2023-01-10 PROCEDURE — 76040000007: Performed by: INTERNAL MEDICINE

## 2023-01-10 PROCEDURE — 2709999900 HC NON-CHARGEABLE SUPPLY: Performed by: INTERNAL MEDICINE

## 2023-01-10 RX ORDER — EPINEPHRINE 0.1 MG/ML
1 INJECTION INTRACARDIAC; INTRAVENOUS
Status: DISCONTINUED | OUTPATIENT
Start: 2023-01-10 | End: 2023-01-10 | Stop reason: HOSPADM

## 2023-01-10 RX ORDER — NALOXONE HYDROCHLORIDE 0.4 MG/ML
0.4 INJECTION, SOLUTION INTRAMUSCULAR; INTRAVENOUS; SUBCUTANEOUS
Status: DISCONTINUED | OUTPATIENT
Start: 2023-01-10 | End: 2023-01-10 | Stop reason: HOSPADM

## 2023-01-10 RX ORDER — FLUMAZENIL 0.1 MG/ML
0.2 INJECTION INTRAVENOUS
Status: DISCONTINUED | OUTPATIENT
Start: 2023-01-10 | End: 2023-01-10 | Stop reason: HOSPADM

## 2023-01-10 RX ORDER — SODIUM CHLORIDE 9 MG/ML
125 INJECTION, SOLUTION INTRAVENOUS CONTINUOUS
Status: DISCONTINUED | OUTPATIENT
Start: 2023-01-10 | End: 2023-01-10 | Stop reason: HOSPADM

## 2023-01-10 RX ORDER — MIDAZOLAM HYDROCHLORIDE 1 MG/ML
.25-5 INJECTION, SOLUTION INTRAMUSCULAR; INTRAVENOUS
Status: DISCONTINUED | OUTPATIENT
Start: 2023-01-10 | End: 2023-01-10 | Stop reason: HOSPADM

## 2023-01-10 RX ORDER — ATROPINE SULFATE 0.1 MG/ML
0.5 INJECTION INTRAVENOUS
Status: DISCONTINUED | OUTPATIENT
Start: 2023-01-10 | End: 2023-01-10 | Stop reason: HOSPADM

## 2023-01-10 RX ORDER — PROPOFOL 10 MG/ML
INJECTION, EMULSION INTRAVENOUS AS NEEDED
Status: DISCONTINUED | OUTPATIENT
Start: 2023-01-10 | End: 2023-01-10 | Stop reason: HOSPADM

## 2023-01-10 RX ORDER — DEXTROMETHORPHAN/PSEUDOEPHED 2.5-7.5/.8
1.2 DROPS ORAL
Status: DISCONTINUED | OUTPATIENT
Start: 2023-01-10 | End: 2023-01-10 | Stop reason: HOSPADM

## 2023-01-10 RX ORDER — SODIUM CHLORIDE 9 MG/ML
25 INJECTION, SOLUTION INTRAVENOUS CONTINUOUS
Status: DISCONTINUED | OUTPATIENT
Start: 2023-01-10 | End: 2023-01-10 | Stop reason: HOSPADM

## 2023-01-10 RX ORDER — DIPHENHYDRAMINE HYDROCHLORIDE 50 MG/ML
50 INJECTION, SOLUTION INTRAMUSCULAR; INTRAVENOUS ONCE
Status: DISCONTINUED | OUTPATIENT
Start: 2023-01-10 | End: 2023-01-10 | Stop reason: HOSPADM

## 2023-01-10 RX ORDER — LIDOCAINE HYDROCHLORIDE 20 MG/ML
INJECTION, SOLUTION EPIDURAL; INFILTRATION; INTRACAUDAL; PERINEURAL AS NEEDED
Status: DISCONTINUED | OUTPATIENT
Start: 2023-01-10 | End: 2023-01-10 | Stop reason: HOSPADM

## 2023-01-10 RX ADMIN — LIDOCAINE HYDROCHLORIDE 40 MG: 20 INJECTION, SOLUTION EPIDURAL; INFILTRATION; INTRACAUDAL; PERINEURAL at 09:01

## 2023-01-10 RX ADMIN — SODIUM CHLORIDE 25 ML/HR: 9 INJECTION, SOLUTION INTRAVENOUS at 08:21

## 2023-01-10 RX ADMIN — PROPOFOL 150 MG: 10 INJECTION, EMULSION INTRAVENOUS at 09:26

## 2023-01-10 NOTE — ANESTHESIA PREPROCEDURE EVALUATION
Relevant Problems   No relevant active problems       Anesthetic History   No history of anesthetic complications            Review of Systems / Medical History  Patient summary reviewed, nursing notes reviewed and pertinent labs reviewed    Pulmonary  Within defined limits                 Neuro/Psych   Within defined limits           Cardiovascular                  Exercise tolerance: >4 METS     GI/Hepatic/Renal  Within defined limits              Endo/Other        Arthritis     Other Findings              Physical Exam    Airway  Mallampati: II  TM Distance: > 6 cm  Neck ROM: normal range of motion   Mouth opening: Normal     Cardiovascular    Rhythm: regular  Rate: normal         Dental  No notable dental hx       Pulmonary  Breath sounds clear to auscultation               Abdominal  GI exam deferred       Other Findings            Anesthetic Plan    ASA: 2  Anesthesia type: MAC          Induction: Intravenous  Anesthetic plan and risks discussed with: Patient

## 2023-01-10 NOTE — ANESTHESIA POSTPROCEDURE EVALUATION
Procedure(s):  COLONOSCOPY.    MAC, total IV anesthesia    Anesthesia Post Evaluation        Patient location during evaluation: PACU  Note status: Adequate. Level of consciousness: responsive to verbal stimuli and sleepy but conscious  Pain management: satisfactory to patient  Airway patency: patent  Anesthetic complications: no  Cardiovascular status: acceptable  Respiratory status: acceptable  Hydration status: acceptable  Comments: +Post-Anesthesia Evaluation and Assessment    Patient: Carrie Soto MRN: [id number]  SSN: [id number]   YOB: 1945  Age: 76 y.o. Sex: male      Cardiovascular Function/Vital Signs    /67   Pulse 67   Temp 36.7 °C (98 °F)   Resp 15   Ht 5' 8\" (1.727 m)   Wt 85.3 kg (188 lb)   SpO2 97%   BMI 28.59 kg/m²     Patient is status post Procedure(s):  COLONOSCOPY. Nausea/Vomiting: Controlled. Postoperative hydration reviewed and adequate. Pain:  Pain Scale 1: Numeric (0 - 10) (01/10/23 0954)  Pain Intensity 1: 0 (01/10/23 0954)   Managed. Neurological Status: At baseline. Mental Status and Level of Consciousness: Arousable. Pulmonary Status:   O2 Device: None (Room air) (01/10/23 0954)   Adequate oxygenation and airway patent. Complications related to anesthesia: None    Post-anesthesia assessment completed. No concerns. Signed By: Shala Crawford DO    1/10/2023  Post anesthesia nausea and vomiting:  controlled      INITIAL Post-op Vital signs:   Vitals Value Taken Time   /67 01/10/23 0954   Temp 36.7 °C (98 °F) 01/10/23 0939   Pulse 64 01/10/23 0954   Resp 17 01/10/23 0954   SpO2 97 % 01/10/23 0954   Vitals shown include unvalidated device data.

## 2023-01-10 NOTE — DISCHARGE INSTRUCTIONS
Mccrary Griffin  [id number]  1945    It was my pleasure seeing you for your procedure. You will also receive a summary report with the findings from this procedure and any further recommendations. If you had polyps removed or biopsies taken during your procedure, you will receive a separate letter from me within the next 2 weeks. If you don't receive this letter or if you have any questions, please call my office [phone]. Please take note of the post procedure instructions listed below. Best Wishes,    Dr. Francy Morin    These instructions give you information on caring for yourself after your procedure. Call your doctor if you have any problems or questions after your procedure. HOME CARE  Walk if you have belly cramping or gas. Walking will help get rid of the air and reduce the bloated feeling in your belly (abdomen). Your IV site (where you received drugs) may be tender to touch. Place warm towels on the site; keep your arm up on two pillows if you have any swelling or soreness in the area. You may shower. ACTIVITY:  Take frequent rest periods and move at a slower pace for the next 24 hours. .  You may resume your regular activity tomorrow if you are feeling back to normal.  Do not drive or ride a bicycle for at least 24 hours (because of the medicine (anesthesia) used during the test). Do not sign any important legal documents or use or operate any machinery for 24 hours  Do not take sleeping medicines/nerve drugs for 24 hours unless the doctor tells you. You can return to work/school tomorrow unless otherwise instructed. NUTRITION:  Drink plenty of fluids to keep your pee (urine) clear or pale yellow  Begin with a light meal and progress to your normal diet. Heavy or fried foods are harder to digest and may make you feel sick to your stomach (nauseated).   Once you are feeling back to normal, you may resume your normal diet as instructed by your doctor. Avoid alcoholic beverages for 24 hours or as instructed. IF YOU HAD BIOPSIES TAKEN OR POLYPS REMOVED DURING THE PROCEDURE:  For the next 7 days, avoid all non-steroidal antiinflammatory medications such as Ibuprofen, Motrin, Advil, Alleve, Lisa-seltzer, Goody's powder, BC powder. If you do not have an heart condition that requires you to take a daily aspirin, you should avoid taking aspirin for 7 days. Eat a soft diet for 24 hours. Monitor your stools for any blood or dark black, tar-like, stools as this may be a sign of bleeding and if you see any blood, notify your doctor immediately. GET HELP RIGHT AWAY AND SEEK IMMEDIATE MEDICAL CARE IF:  You have more than a spotting of blood in your stool. You pass clumps of tissue (blood clots) or fill the toilet with blood. Your belly is painfully swollen or puffy (abdominal distention). You throw up (vomit). You have a fever. You have redness, pain or swelling at the IV site that last greater than two days. You have abdominal pain or discomfort that is severe or gets worse throughout the day. Post-procedure diagnosis:  Colon: anal polyp    Post-procedure recommendations:  - Repeat colonoscopy based on path of rectal / anal polyp  - discussed with Vaishnavi. Learning About Coronavirus [phone])  Coronavirus [phone]): Overview  What is coronavirus (COVID-19)? The coronavirus disease (COVID-19) is caused by a virus. It is an illness that was first found in [place], [place], in December 2019. It has since spread worldwide. The virus can cause fever, cough, and trouble breathing. In severe cases, it can cause pneumonia and make it hard to breathe without help. It can cause death. Coronaviruses are a large group of viruses. They cause the common cold. They also cause more serious illnesses like Middle East respiratory syndrome (MERS) and severe acute respiratory syndrome (SARS).  COVID-19 is caused by a novel coronavirus. That means it's a new type that has not been seen in people before. This virus spreads person-to-person through droplets from coughing and sneezing. It can also spread when you are close to someone who is infected. And it can spread when you touch something that has the virus on it, such as a doorknob or a tabletop. What can you do to protect yourself from coronavirus (COVID-19)? The best way to protect yourself from getting sick is to: Avoid areas where there is an outbreak. Avoid contact with people who may be infected. Wash your hands often with soap or alcohol-based hand sanitizers. Avoid crowds and try to stay at least 6 feet away from other people. Wash your hands often, especially after you cough or sneeze. Use soap and water, and scrub for at least 20 seconds. If soap and water aren't available, use an alcohol-based hand . Avoid touching your mouth, nose, and eyes. What can you do to avoid spreading the virus to others? To help avoid spreading the virus to others:  Cover your mouth with a tissue when you cough or sneeze. Then throw the tissue in the trash. Use a disinfectant to clean things that you touch often. Stay home if you are sick or have been exposed to the virus. Don't go to school, work, or public areas. And don't use public transportation. If you are sick:  Leave your home only if you need to get medical care. But call the doctor's office first so they know you're coming. And wear a face mask, if you have one. If you have a face mask, wear it whenever you're around other people. It can help stop the spread of the virus when you cough or sneeze. Clean and disinfect your home every day. Use household  and disinfectant wipes or sprays. Take special care to clean things that you grab with your hands. These include doorknobs, remote controls, phones, and handles on your refrigerator and microwave.  And don't forget countertops, tabletops, bathrooms, and computer keyboards. When to call for help  Call 911 anytime you think you may need emergency care. For example, call if:  You have severe trouble breathing. (You can't talk at all.)  You have constant chest pain or pressure. You are severely dizzy or lightheaded. You are confused or can't think clearly. Your face and lips have a blue color. You pass out (lose consciousness) or are very hard to wake up. Call your doctor now if you develop symptoms such as:  Shortness of breath. Fever. Cough. If you need to get care, call ahead to the doctor's office for instructions before you go. Make sure you wear a face mask, if you have one, to prevent exposing other people to the virus. Where can you get the latest information? The following health organizations are tracking and studying this virus. Their websites contain the most up-to-date information. Emilia Ramirez also learn what to do if you think you may have been exposed to the virus. U.S. Centers for Disease Control and Prevention (CDC): The CDC provides updated news about the disease and travel advice. The website also tells you how to prevent the spread of infection. www.cdc.gov  World Health Organization [facility]): WHO offers information about the virus outbreaks. WHO also has travel advice. www.who.int  Current as of: April 1, 2020               Content Version: 12.4  © 1408-1652 [employer]. Care instructions adapted under license by your healthcare professional. If you have questions about a medical condition or this instruction, always ask your healthcare professional. [address] any warranty or liability for your use of this information.        Patient Education on Sedation / Analgesia Administered for Procedure      For 24 hours after general anesthesia or intravenous analgesia / sedation:  Have someone responsible help you with your care  Limit your activities  Do not drive and operate hazardous machinery  Do not make important personal, legal or business decisions  Do not drink alcoholic beverages  If you have not urinated within 8 hours after discharge, please contact your physician  Resume your medications unless otherwise instructed    For 24 hours after general anesthesia or intravenous analgesia / sedation  you may experience:  Drowsiness, dizziness, sleepiness, or confusion  Difficulty remembering or delayed reaction times  Difficulty with your balance, especially while walking, move slowly and carefully, do not make sudden position changes  Difficulty focusing or blurred vision    You may not be aware of slight changes in your behavior and/or your reaction time because of the medication used during and after your procedure.     Report the following to your physician:  Excessive pain, swelling, redness or odor of or around the surgical area  Temperature over 100.5  Nausea and vomiting lasting longer than 4 hours or if unable to take medications  Any signs of decreased circulation or nerve impairment to extremity: change in color, persistent numbness, tingling, coldness or increase pain  Any questions or concerns    IF YOU REPORT TO AN EMERGENCY ROOM, DOCTOR'S OFFICE OR HOSPITAL WITHIN 24 HOURS AFTER YOUR PROCEDURE, BRING THIS SHEET AND YOUR AFTER VISIT SUMMARY WITH YOU AND GIVE IT TO THE PHYSICIAN OR NURSE ATTENDING YOU.

## 2023-01-10 NOTE — ROUTINE PROCESS
Jessica Adis  1945  [id number]    Situation:  Verbal report received from: TEJAS Frazier  Procedure: Procedure(s):  COLONOSCOPY    Background:    Preoperative diagnosis: SCREENING, CONSTIPATION  Postoperative diagnosis: Colon: anal polyp    :  Dr. Marcos Pollack RN  Assistant(s): Endoscopy Technician-1: Harsha Brooks  Endoscopy RN-1: Karin Navarro RN    Specimens: * No specimens in log *  H. Pylori  no    Assessment:  Intra-procedure medications    Anesthesia gave intra-procedure sedation and medications, see anesthesia flow sheet yes    Intravenous fluids: NS@ KVO     Vital signs stable     Abdominal assessment: round and soft     Recommendation:  Discharge patient per MD order.   Family or Friend   Permission to share finding with family or friend yes

## 2023-01-10 NOTE — PROCEDURES
Colonoscopy Procedure Note    Indications:   See Preoperative Diagnosis above  Referring Physician: Rena Andrew MD  Anesthesia/Sedation: MAC anesthesia Propofol  Endoscopist:  Dr. Sravanthi Rodriguez  Assistant:  Endoscopy Technician-1: Kayy Ridley  Endoscopy RN-1: Karin Navarro RN  Preoperative diagnosis: SCREENING, CONSTIPATION  Postoperative diagnosis: Colon:     Procedure in Detail:  Informed consent was obtained for the procedure, including sedation. Risks of perforation, hemorrhage, adverse drug reaction, and aspiration were discussed. The patient was placed in the left lateral decubitus position. Based on the pre-procedure assessment, including review of the patient's medical history, medications, allergies, and review of systems, he had been deemed to be an appropriate candidate for moderate sedation; he was therefore sedated with the medications listed above. The patient was monitored continuously with ECG tracing, pulse oximetry, blood pressure monitoring, and direct observations. A rectal examination was performed. The [id number] was inserted into the rectum and advanced under direct vision to the cecum, which was identified by the ileocecal valve and appendiceal orifice. The quality of the colonic preparation was good BPS 2/2/2 6 after lavage. A careful inspection was made as the colonoscope was withdrawn, including a retroflexed view of the rectum; findings and interventions are described below. Appropriate photodocumentation was obtained. Findings:  DELL: small polyp palpated  Rectum: 10-15mm adenomatous polyp appreciated within the anal canal, seen on retroflexion as well. Not biopsied as it appeared to be at / below the dentate line. Will need examination and resection by colorectal surgery.   Sigmoid: normal  no mucosal lesion appreciated  Descending Colon: normal  no mucosal lesion appreciated  Transverse Colon: normal  no mucosal lesion appreciated  Ascending Colon: normal  no mucosal lesion appreciated  Cecum: normal  no mucosal lesion appreciated  Terminal Ileum: not intubated    EBL: none    Complications: None; patient tolerated the procedure well. Recommendations:   - Repeat colonoscopy based on path of rectal / anal polyp  - discussed with Vaishnavi.       Signed By: Radha Dillon MD                        January 10, 2023

## 2023-02-23 RX ORDER — NAPROXEN SODIUM 220 MG
440 TABLET ORAL 2 TIMES DAILY WITH MEALS
COMMUNITY

## 2023-02-23 RX ORDER — FAMOTIDINE 10 MG/1
20 TABLET ORAL EVERY MORNING
COMMUNITY

## 2023-02-23 RX ORDER — POLYETHYLENE GLYCOL 3350 17 G/17G
17 POWDER, FOR SOLUTION ORAL 3 TIMES DAILY
COMMUNITY

## 2023-02-23 RX ORDER — ASPIRIN 81 MG/1
81 TABLET ORAL DAILY
COMMUNITY

## 2023-02-23 NOTE — PERIOP NOTES
[facility]  Preoperative Instructions        Surgery Date Tuesday, 2/28          Time of Arrival TBD/TBC @ [phone]    1. On the day of your surgery, please report to the Surgical Services Registration Desk and sign in at your designated time. The Surgery Center is located to the right of the Emergency Room. 2. You must have someone with you to drive you home. You should not drive a car for 24 hours following surgery. Please make arrangements for a friend or family member to stay with you for the first 24 hours after your surgery. 3. Do not have anything to eat or drink (including water, gum, mints, coffee, juice) after midnight Monday, 2/27. ? This may not apply to medications prescribed by your physician. ?(Please note below the special instructions with medications to take the morning of your procedure.)    4. We recommend you do not drink any alcoholic beverages for 24 hours before and after your surgery. 5. Contact your surgeons office for instructions on the following medications: non-steroidal anti-inflammatory drugs (i.e. Advil, Aleve), vitamins, and supplements. (Some surgeons will want you to stop these medications prior to surgery and others may allow you to take them)  **If you are currently taking Plavix, Coumadin, Aspirin and/or other blood-thinning agents, contact your surgeon for instructions. ** Your surgeon will partner with the physician prescribing these medications to determine if it is safe to stop or if you need to continue taking. Please do not stop taking these medications without instructions from your surgeon    6. Wear comfortable clothes. Wear glasses instead of contacts. Do not bring any money or jewelry. Please bring picture ID, insurance card, and any prearranged co-payment or hospital payment. Do not wear make-up, particularly mascara the morning of your surgery. Do not wear nail polish, particularly if you are having foot /hand surgery. Wear your hair loose or down, no ponytails, buns, calixto pins or clips. All body piercings must be removed. Please shower with antibacterial soap for three consecutive days before and on the morning of surgery, but do not apply any lotions, powders or deodorants after the shower on the day of surgery. Please use a fresh towels after each shower. Please sleep in clean clothes and change bed linens the night before surgery. Please do not shave for 48 hours prior to surgery. Shaving of the face is acceptable. 7. You should understand that if you do not follow these instructions your surgery may be cancelled. If your physical condition changes (I.e. fever, cold or flu) please contact your surgeon as soon as possible. 8. It is important that you be on time. If a situation occurs where you may be late, please call [phone] (OR Holding Area). 9. If you have any questions and or problems, please call [phone] (Pre-admission Testing). 10. Your surgery time may be subject to change. You will receive a phone call the evening prior if your time changes. 11.  If having outpatient surgery, you must have someone to drive you here, stay with you during the duration of your stay, and to drive you home at time of discharge. TAKE ALL MEDICATIONS DAY OF SURGERY EXCEPT: Follow your physician/surgeon instructions for holding all non-steroidal anti-inflammatory drugs/NSAIDs (Aleve), blood-thinning agents (aspirin), vitamins & supplements prior to surgery. I understand a pre-operative phone call will be made to verify my surgery time. In the event that I am not available, I give permission for a message to be left on my answering service and/or with another person?   yes         ___________________      __________   _________    (Signature of Patient)             (Witness)                (Date and Time)

## 2023-02-28 ENCOUNTER — HOSPITAL ENCOUNTER (OUTPATIENT)
Age: 78
Setting detail: OUTPATIENT SURGERY
Discharge: HOME | End: 2023-02-28
Attending: SURGERY | Admitting: SURGERY
Payer: MEDICARE

## 2023-02-28 ENCOUNTER — ANESTHESIA (OUTPATIENT)
Dept: SURGERY | Age: 78
End: 2023-02-28
Payer: MEDICARE

## 2023-02-28 ENCOUNTER — ANESTHESIA EVENT (OUTPATIENT)
Dept: SURGERY | Age: 78
End: 2023-02-28
Payer: MEDICARE

## 2023-02-28 VITALS
RESPIRATION RATE: 9 BRPM | BODY MASS INDEX: 29.58 KG/M2 | DIASTOLIC BLOOD PRESSURE: 65 MMHG | HEART RATE: 69 BPM | WEIGHT: 188.49 LBS | SYSTOLIC BLOOD PRESSURE: 134 MMHG | TEMPERATURE: 97.4 F | OXYGEN SATURATION: 93 % | HEIGHT: 67 IN

## 2023-02-28 DIAGNOSIS — R52 PAIN: Primary | ICD-10-CM

## 2023-02-28 PROCEDURE — 76010000138 HC OR TIME 0.5 TO 1 HR: Performed by: SURGERY

## 2023-02-28 PROCEDURE — 76210000016 HC OR PH I REC 1 TO 1.5 HR: Performed by: SURGERY

## 2023-02-28 PROCEDURE — 77030013079 HC BLNKT BAIR HGGR 3M -A

## 2023-02-28 PROCEDURE — 76060000032 HC ANESTHESIA 0.5 TO 1 HR: Performed by: SURGERY

## 2023-02-28 PROCEDURE — 74011000250 HC RX REV CODE- 250

## 2023-02-28 PROCEDURE — 77030008684 HC TU ET CUF COVD -B

## 2023-02-28 PROCEDURE — 77030031139 HC SUT VCRL2 J&J -A: Performed by: SURGERY

## 2023-02-28 PROCEDURE — 74011250637 HC RX REV CODE- 250/637: Performed by: SURGERY

## 2023-02-28 PROCEDURE — 74011250636 HC RX REV CODE- 250/636: Performed by: STUDENT IN AN ORGANIZED HEALTH CARE EDUCATION/TRAINING PROGRAM

## 2023-02-28 PROCEDURE — 88305 TISSUE EXAM BY PATHOLOGIST: CPT

## 2023-02-28 PROCEDURE — 77030002996 HC SUT SLK J&J -A: Performed by: SURGERY

## 2023-02-28 PROCEDURE — 77030039825 HC MSK NSL PAP SUPERNO2VA VYRM -B: Performed by: ANESTHESIOLOGY

## 2023-02-28 PROCEDURE — 74011250636 HC RX REV CODE- 250/636

## 2023-02-28 PROCEDURE — 76210000020 HC REC RM PH II FIRST 0.5 HR: Performed by: SURGERY

## 2023-02-28 PROCEDURE — 74011000250 HC RX REV CODE- 250: Performed by: SURGERY

## 2023-02-28 PROCEDURE — 2709999900 HC NON-CHARGEABLE SUPPLY: Performed by: SURGERY

## 2023-02-28 PROCEDURE — 77030026438 HC STYL ET INTUB CARD -A

## 2023-02-28 RX ORDER — EPHEDRINE SULFATE/0.9% NACL/PF 50 MG/5 ML
SYRINGE (ML) INTRAVENOUS AS NEEDED
Status: DISCONTINUED | OUTPATIENT
Start: 2023-02-28 | End: 2023-02-28 | Stop reason: HOSPADM

## 2023-02-28 RX ORDER — DEXAMETHASONE SODIUM PHOSPHATE 4 MG/ML
INJECTION, SOLUTION INTRA-ARTICULAR; INTRALESIONAL; INTRAMUSCULAR; INTRAVENOUS; SOFT TISSUE AS NEEDED
Status: DISCONTINUED | OUTPATIENT
Start: 2023-02-28 | End: 2023-02-28 | Stop reason: HOSPADM

## 2023-02-28 RX ORDER — ONDANSETRON 2 MG/ML
4 INJECTION INTRAMUSCULAR; INTRAVENOUS AS NEEDED
Status: DISCONTINUED | OUTPATIENT
Start: 2023-02-28 | End: 2023-02-28 | Stop reason: HOSPADM

## 2023-02-28 RX ORDER — PROPOFOL 10 MG/ML
INJECTION, EMULSION INTRAVENOUS AS NEEDED
Status: DISCONTINUED | OUTPATIENT
Start: 2023-02-28 | End: 2023-02-28 | Stop reason: HOSPADM

## 2023-02-28 RX ORDER — DEXMEDETOMIDINE HYDROCHLORIDE 100 UG/ML
INJECTION, SOLUTION INTRAVENOUS AS NEEDED
Status: DISCONTINUED | OUTPATIENT
Start: 2023-02-28 | End: 2023-02-28 | Stop reason: HOSPADM

## 2023-02-28 RX ORDER — FENTANYL CITRATE 50 UG/ML
50 INJECTION, SOLUTION INTRAMUSCULAR; INTRAVENOUS AS NEEDED
Status: CANCELLED | OUTPATIENT
Start: 2023-02-28

## 2023-02-28 RX ORDER — OXYCODONE HYDROCHLORIDE 5 MG/1
5 TABLET ORAL
Qty: 20 TABLET | Refills: 0 | Status: SHIPPED | OUTPATIENT
Start: 2023-02-28 | End: 2023-03-03

## 2023-02-28 RX ORDER — SUCCINYLCHOLINE CHLORIDE 20 MG/ML
INJECTION INTRAMUSCULAR; INTRAVENOUS AS NEEDED
Status: DISCONTINUED | OUTPATIENT
Start: 2023-02-28 | End: 2023-02-28 | Stop reason: HOSPADM

## 2023-02-28 RX ORDER — SODIUM CHLORIDE, SODIUM LACTATE, POTASSIUM CHLORIDE, CALCIUM CHLORIDE 600; 310; 30; 20 MG/100ML; MG/100ML; MG/100ML; MG/100ML
25 INJECTION, SOLUTION INTRAVENOUS CONTINUOUS
Status: DISCONTINUED | OUTPATIENT
Start: 2023-02-28 | End: 2023-02-28 | Stop reason: HOSPADM

## 2023-02-28 RX ORDER — FENTANYL CITRATE 50 UG/ML
INJECTION, SOLUTION INTRAMUSCULAR; INTRAVENOUS AS NEEDED
Status: DISCONTINUED | OUTPATIENT
Start: 2023-02-28 | End: 2023-02-28 | Stop reason: HOSPADM

## 2023-02-28 RX ORDER — SODIUM CHLORIDE, SODIUM LACTATE, POTASSIUM CHLORIDE, CALCIUM CHLORIDE 600; 310; 30; 20 MG/100ML; MG/100ML; MG/100ML; MG/100ML
25 INJECTION, SOLUTION INTRAVENOUS CONTINUOUS
Status: CANCELLED | OUTPATIENT
Start: 2023-02-28 | End: 2023-03-01

## 2023-02-28 RX ORDER — BUPIVACAINE HYDROCHLORIDE AND EPINEPHRINE 2.5; 5 MG/ML; UG/ML
INJECTION, SOLUTION EPIDURAL; INFILTRATION; INTRACAUDAL; PERINEURAL AS NEEDED
Status: DISCONTINUED | OUTPATIENT
Start: 2023-02-28 | End: 2023-02-28 | Stop reason: HOSPADM

## 2023-02-28 RX ORDER — LIDOCAINE HYDROCHLORIDE 10 MG/ML
0.1 INJECTION, SOLUTION EPIDURAL; INFILTRATION; INTRACAUDAL; PERINEURAL AS NEEDED
Status: CANCELLED | OUTPATIENT
Start: 2023-02-28

## 2023-02-28 RX ORDER — ONDANSETRON 2 MG/ML
INJECTION INTRAMUSCULAR; INTRAVENOUS AS NEEDED
Status: DISCONTINUED | OUTPATIENT
Start: 2023-02-28 | End: 2023-02-28 | Stop reason: HOSPADM

## 2023-02-28 RX ORDER — PHENYLEPHRINE HCL IN 0.9% NACL 0.4MG/10ML
SYRINGE (ML) INTRAVENOUS AS NEEDED
Status: DISCONTINUED | OUTPATIENT
Start: 2023-02-28 | End: 2023-02-28 | Stop reason: HOSPADM

## 2023-02-28 RX ORDER — FENTANYL CITRATE 50 UG/ML
25 INJECTION, SOLUTION INTRAMUSCULAR; INTRAVENOUS
Status: DISCONTINUED | OUTPATIENT
Start: 2023-02-28 | End: 2023-02-28 | Stop reason: HOSPADM

## 2023-02-28 RX ORDER — LIDOCAINE HYDROCHLORIDE 20 MG/ML
INJECTION, SOLUTION EPIDURAL; INFILTRATION; INTRACAUDAL; PERINEURAL AS NEEDED
Status: DISCONTINUED | OUTPATIENT
Start: 2023-02-28 | End: 2023-02-28 | Stop reason: HOSPADM

## 2023-02-28 RX ORDER — HYDROMORPHONE HYDROCHLORIDE 1 MG/ML
.2-.5 INJECTION, SOLUTION INTRAMUSCULAR; INTRAVENOUS; SUBCUTANEOUS
Status: DISCONTINUED | OUTPATIENT
Start: 2023-02-28 | End: 2023-02-28 | Stop reason: HOSPADM

## 2023-02-28 RX ORDER — MIDAZOLAM HYDROCHLORIDE 1 MG/ML
1 INJECTION, SOLUTION INTRAMUSCULAR; INTRAVENOUS AS NEEDED
Status: CANCELLED | OUTPATIENT
Start: 2023-02-28

## 2023-02-28 RX ADMIN — LIDOCAINE HYDROCHLORIDE 100 MG: 20 INJECTION, SOLUTION EPIDURAL; INFILTRATION; INTRACAUDAL; PERINEURAL at 12:22

## 2023-02-28 RX ADMIN — ONDANSETRON HYDROCHLORIDE 4 MG: 2 INJECTION, SOLUTION INTRAMUSCULAR; INTRAVENOUS at 12:57

## 2023-02-28 RX ADMIN — PROPOFOL 100 MG: 10 INJECTION, EMULSION INTRAVENOUS at 12:39

## 2023-02-28 RX ADMIN — DEXMEDETOMIDINE HYDROCHLORIDE 4 MCG: 100 INJECTION, SOLUTION, CONCENTRATE INTRAVENOUS at 12:35

## 2023-02-28 RX ADMIN — PROPOFOL 50 MG: 10 INJECTION, EMULSION INTRAVENOUS at 12:28

## 2023-02-28 RX ADMIN — DEXAMETHASONE SODIUM PHOSPHATE 4 MG: 4 INJECTION, SOLUTION INTRAMUSCULAR; INTRAVENOUS at 12:35

## 2023-02-28 RX ADMIN — PROPOFOL 200 MG: 10 INJECTION, EMULSION INTRAVENOUS at 12:22

## 2023-02-28 RX ADMIN — FENTANYL CITRATE 50 MCG: 50 INJECTION, SOLUTION INTRAMUSCULAR; INTRAVENOUS at 12:28

## 2023-02-28 RX ADMIN — SODIUM CHLORIDE, POTASSIUM CHLORIDE, SODIUM LACTATE AND CALCIUM CHLORIDE: 600; 310; 30; 20 INJECTION, SOLUTION INTRAVENOUS at 12:18

## 2023-02-28 RX ADMIN — Medication 80 MCG: at 12:48

## 2023-02-28 RX ADMIN — FENTANYL CITRATE 50 MCG: 50 INJECTION, SOLUTION INTRAMUSCULAR; INTRAVENOUS at 12:40

## 2023-02-28 RX ADMIN — Medication 80 MCG: at 12:50

## 2023-02-28 RX ADMIN — SUCCINYLCHOLINE CHLORIDE 170 MG: 20 INJECTION, SOLUTION INTRAMUSCULAR; INTRAVENOUS at 12:23

## 2023-02-28 RX ADMIN — Medication 5 MG: at 12:46

## 2023-02-28 RX ADMIN — FENTANYL CITRATE 50 MCG: 50 INJECTION, SOLUTION INTRAMUSCULAR; INTRAVENOUS at 12:22

## 2023-02-28 RX ADMIN — Medication 3 AMPULE: at 09:44

## 2023-02-28 RX ADMIN — SODIUM CHLORIDE, POTASSIUM CHLORIDE, SODIUM LACTATE AND CALCIUM CHLORIDE 25 ML/HR: 600; 310; 30; 20 INJECTION, SOLUTION INTRAVENOUS at 09:43

## 2023-02-28 NOTE — ANESTHESIA PREPROCEDURE EVALUATION
Relevant Problems   NEUROLOGY   (+) Convulsion (HCC)       Anesthetic History   No history of anesthetic complications            Review of Systems / Medical History  Patient summary reviewed, nursing notes reviewed and pertinent labs reviewed    Pulmonary  Within defined limits                 Neuro/Psych   Within defined limits           Cardiovascular                  Exercise tolerance: >4 METS     GI/Hepatic/Renal  Within defined limits              Endo/Other        Arthritis     Other Findings   Comments: History of Factor V Leiden           Physical Exam    Airway  Mallampati: II  TM Distance: > 6 cm  Neck ROM: normal range of motion   Mouth opening: Normal     Cardiovascular    Rhythm: regular  Rate: normal         Dental  No notable dental hx       Pulmonary  Breath sounds clear to auscultation               Abdominal  GI exam deferred       Other Findings            Anesthetic Plan    ASA: 2  Anesthesia type: general          Induction: Intravenous  Anesthetic plan and risks discussed with: Patient

## 2023-02-28 NOTE — BRIEF OP NOTE
Brief Postoperative Note    Patient: Daniel Phelan  YOB: 1945  MRN: [id number]    Date of Procedure: 2/28/2023     Pre-Op Diagnosis: ANAL POLYP    Post-Op Diagnosis:  Anal mass       Procedure(s):  TRANSANAL EXCISION OF ANAL MASS    Surgeon(s):  Carey Doss MD    Surgical Assistant: Surg Asst-1: Elisabeth Mott RN    Anesthesia: General     Estimated Blood Loss (mL): Minimal    Complications: None    Specimens:   ID Type Source Tests Collected by Time Destination   1 : Anal mass Preservative Anus  Carey Doss MD 2/28/2023 1247 Pathology        Implants: * No implants in log *    Drains: * No LDAs found *    Findings: Submucosal anal mass    Electronically Signed by Marjorie Lucas MD on 2/28/2023 at 12:53 PM

## 2023-02-28 NOTE — PERIOP NOTES
1425  Discharge brochure provided; Reviewed DC instructions with patient. Opportunity for questions and clarifications was provided; verbalized understanding. Follow-up appointments reviewed/written for patient. Pt stable and ambulatory for discharge; Patient's wife, Ev Mann, to provide transportation to home. Clarified all personal belongings sent with patient. IV removed (without difficulty/pt tolerated well) Telemetry discontinued. [phone] Patient discharged home with his wife, no concerns voiced at time of discharge.

## 2023-02-28 NOTE — DISCHARGE INSTRUCTIONS
SEE DISCHARGE INSTRUCTIONS FOR DR Jodie Corrales AMBULATORY PATIENTS                DISCHARGE SUMMARY from Nurse    PATIENT INSTRUCTIONS:    After general anesthesia or intravenous sedation, for 24 hours or while taking prescription narcotics:    Have someone responsible help you with your care  Limit your activities  Do not drive and operate hazardous machinery  Do not make important personal, legal or business decisions  Do not drink alcoholic beverages  If you have not urinated within 8 hours after discharge, please contact your surgeon on call  Resume your medications unless otherwise instructed    From general anesthesia, intravenous sedation, or while taking prescription narcotics, you may experience:    Drowsiness, dizziness, sleepiness, or confusion  Difficulty remembering or delayed reaction times  Difficulty with your balance, especially while walking, move slowly and carefully, do not make sudden position changes  Difficulty focusing or blurred vision    You may not be aware of slight changes in your behavior and/or your reaction time because of the medication used during and after your procedure. Report the following to your surgeon:  Excessive pain, swelling, redness or odor of or around the surgical area  Temperature over 100.5  Nausea and vomiting lasting longer than 4 hours or if unable to take medications  Any signs of decreased circulation or nerve impairment to extremity: change in color, persistent numbness, tingling, coldness or increase pain  Any questions or concerns         IF YOU REPORT TO AN EMERGENCY ROOM, DOCTOR'S OFFICE OR HOSPITAL WITHIN 24 HOURS AFTER YOUR PROCEDURE, BRING THIS SHEET AND YOUR AFTER VISIT SUMMARY WITH YOU AND GIVE IT TO THE PHYSICIAN OR NURSE ATTENDING YOU. These are general instructions for a healthy lifestyle (if applicable):     No smoking/ No tobacco products/ Avoid exposure to secondhand smoke  Surgeon General's Warning:  Quitting smoking now greatly reduces serious risk to your health. Obesity, smoking, and sedentary lifestyle greatly increases your risk for illness    A healthy diet, regular physical exercise & weight monitoring are important for maintaining a healthy lifestyle    You may be retaining fluid if you have a history of heart failure or if you experience any of the following symptoms:  Weight gain of 3 pounds or more overnight or 5 pounds in a week, increased swelling in our hands or feet or shortness of breath while lying flat in bed. Please call your doctor as soon as you notice any of these symptoms; do not wait until your next office visit. A common side effect of anesthesia following surgery is nausea and/or vomiting. In order to decrease symptoms, it is wise to avoid foods that are high in fat, greasy foods, milk products, and spicy foods for the first 24 hours. Acceptable foods for the first 24 hours following surgery include but are not limited to:    soup  broth  toast   crackers   applesauce  bananas   mashed potatoes,  soft or scrambled eggs  oatmeal  jello    It is important to eat when taking your pain medication. This will help to prevent nausea. If possible, please try to time your meals with your medications. It is very important to stay hydrated following surgery. Sip fluids frequently while awake. Avoid acidic drinks such as citrus juices and soda for 24 hours. Carbonated beverages may cause bloating and gas. Acceptable fluids include:    water (flavor packets may add variety)  coffee or tea (in moderation)  Gatorade  Caden-Aid  apple juice  cranberry juice    You are encouraged to cough and deep breathe every hour when awake. This will help to prevent respiratory complications following anesthesia. You may want to hug a pillow when coughing and sneezing to add additional support to the surgical area and to decrease discomfort if you had abdominal or chest surgery.     If you are discharged home with support stockings, you may remove them after 24 hours. Support stockings are used to help prevent blood clots in the legs following surgery. TO PREVENT AN INFECTION      [address] YOUR HANDS    To prevent infection, good handwashing is the most important thing you or your caregiver can do. Wash your hands with soap and water or use the hand  we gave you before you touch any wounds. SHOWER    Use the antibacterial soap we gave you when you take a shower. Shower with this soap until your wounds are healed. To reach all areas of your body, you may need someone to help you. Dont forget to clean your belly button with every shower. USE CLEAN SHEETS    Use freshly cleaned sheets on your bed after surgery. To keep the surgery site clean, do not allow pets to sleep with you while your wound is still healing. STOP SMOKING    Stop smoking, or at least cut back on smoking    Smoking slows your healing. CONTROL YOUR BLOOD SUGAR    High blood sugars slow wound healing. If you are diabetic, control your blood sugar levels before and after your surgery. Please take time to review all of your Home Care Instructions and Medication Information sheets provided in your discharge packet. If you have any questions, please contact your surgeon's office. Thank you. The discharge information has been reviewed with the patient and instruction recipient. The patient and instruction recipient verbalized understanding. Discharge medications reviewed with the patient and instruction recipient and appropriate educational materials and side effects teaching were provided. Please provide this summary of care documentation to your next provider. Oxycodone, Rapid Release (ETH-Oxydose, Oxy IR, Roxicodone) - (By mouth)   Why this medicine is used:   Treats moderate to severe pain. This medicine is a narcotic pain reliever. Contact a nurse or doctor right away if you have:   Fast or slow heart beat, shallow breathing, blue lips, skin or fingernails  Anxiety, restlessness, fever, sweating, muscle spasms, twitching, seeing or hearing things that are not there  Extreme weakness, shallow breathing, slow heartbeat  Severe confusion, lightheadedness, dizziness, fainting  Sweating or cold, clammy skin, seizures  Severe constipation, stomach pain, nausea, vomiting     Common side effects:  Mild constipation  Sleepiness, tiredness  © 2017 [facility] Information is for End User's use only and may not be sold, redistributed or otherwise used for commercial purposes.

## 2023-02-28 NOTE — ANESTHESIA POSTPROCEDURE EVALUATION
Procedure(s):  TRANSANAL EXCISION OF ANAL POLYP.    general    Anesthesia Post Evaluation      Multimodal analgesia: multimodal analgesia used between 6 hours prior to anesthesia start to PACU discharge  Patient location during evaluation: PACU  Patient participation: complete - patient participated  Level of consciousness: sleepy but conscious and responsive to verbal stimuli  Pain score: 2  Pain management: adequate  Airway patency: patent  Anesthetic complications: no  Cardiovascular status: acceptable  Respiratory status: acceptable  Hydration status: acceptable  Comments: +Post-Anesthesia Evaluation and Assessment    Patient: Daniel Phelan MRN: [id number]  SSN: [id number]   YOB: 1945  Age: 76 y.o. Sex: male      Cardiovascular Function/Vital Signs    BP (!) 134/55   Pulse 80   Temp 37 °C (98.6 °F)   Resp 15   Ht 5' 7\" (1.702 m)   Wt 85.5 kg (188 lb 7.9 oz)   SpO2 99%   BMI 29.52 kg/m²     Patient is status post Procedure(s):  TRANSANAL EXCISION OF ANAL POLYP. Nausea/Vomiting: Controlled. Postoperative hydration reviewed and adequate. Pain:  Pain Scale 1: Numeric (0 - 10) (02/28/23 1315)  Pain Intensity 1: 0 (02/28/23 1315)   Managed. Neurological Status:   Neuro (WDL): Exceptions to WDL (02/28/23 1315)   At baseline. Mental Status and Level of Consciousness: Arousable. Pulmonary Status:   O2 Device: Nasal cannula (02/28/23 1317)   Adequate oxygenation and airway patent. Complications related to anesthesia: None    Post-anesthesia assessment completed. No concerns.     Signed By: Aren Cortes MD    2/28/2023  Post anesthesia nausea and vomiting:  controlled  Final Post Anesthesia Temperature Assessment:  Normothermia (36.0-37.5 degrees C)      INITIAL Post-op Vital signs:   Vitals Value Taken Time   /55 02/28/23 1320   Temp 37 °C (98.6 °F) 02/28/23 1315   Pulse 74 02/28/23 1326   Resp 14 02/28/23 1326   SpO2 98 % 02/28/23 1326   Vitals shown include unvalidated device data.

## 2023-02-28 NOTE — PROGRESS NOTES
TRANSFER - IN REPORT:    Verbal report received from OR nurse and LUKASZ Lopez CRNA(name) on [employer]  being received from OR(unit) for routine post - op      Report consisted of patients Situation, Background, Assessment and   Recommendations(SBAR). Information from the following report(s) SBAR, Kardex, OR Summary, and MAR was reviewed with the receiving nurse. Opportunity for questions and clarification was provided. Assessment completed upon patients arrival to unit and care assumed.

## 2023-03-01 NOTE — OP NOTES
[address]  OPERATIVE REPORT    Name:  Vamshi Chua  MR#:  [id number]  :  1945  ACCOUNT #:  [de-identified]  DATE OF SERVICE:  2023      PREOPERATIVE DIAGNOSIS:  Anal mass. POSTOPERATIVE DIAGNOSIS:  Anal mass. PROCEDURE PERFORMED:  Transanal excision of anal mass. SURGEON:  Gianna Rose MD    ASSISTANT:  Kang Cotto    ANESTHESIA:  General.    COMPLICATIONS:  None. SPECIMENS REMOVED:  Anal mass. IMPLANTS:  None. ESTIMATED BLOOD LOSS:  Minimal.    DRAINS:  None. FINDINGS:  Submucosal anal mass. DESCRIPTION OF PROCEDURE:  The patient was brought to the operating suite, placed in the supine position, general endotracheal anesthesia was induced. He was then placed in the prone jackknife position. His buttocks were tapped apart. The perianal area was prepped and draped in the usual sterile fashion. Time-out was performed indicating the correct patient and procedure to be performed as per hospital protocol. The patient was noted to have a 2.5 cm submucosal mass in the left anterior anal canal extending to the distal rectum. A circular incision was made encompassing the entirety of the mass. This was dissected down to the submucosa in a full thickness fashion and in to the muscularis propria of the rectum. The mass was excised from its surrounding tissues taking care to avoid any injury to the underlying sphincter muscles as much as possible. The mass was felt to be quite firm that appeared to be mostly submucosal.  After undermining the mass, it was excised en bloc and sent to Pathology for further examination. Hemostasis was achieved with a combination of electrocautery and 2-0 Vicryl sutures. After hemostasis was achieved, 30 mL of 0.25% Marcaine with epinephrine was injected subcutaneously. The patient was awaken and taken to the recovery room in stable condition.         MD EDWINA Mosley/[email or web address]  D:  2023 12:57  T:  02/28/2023 23:15  JOB #:  [id number]

## 2023-03-06 ENCOUNTER — TRANSCRIBE ORDER (OUTPATIENT)
Dept: SCHEDULING | Age: 78
End: 2023-03-06

## 2023-03-06 DIAGNOSIS — D49.0: Primary | ICD-10-CM

## 2023-03-06 DIAGNOSIS — D49.0 SPLEEN NEOPLASM: Primary | ICD-10-CM

## 2023-03-08 ENCOUNTER — HOSPITAL ENCOUNTER (OUTPATIENT)
Dept: CT IMAGING | Age: 78
Discharge: HOME | End: 2023-03-08
Attending: SURGERY
Payer: MEDICARE

## 2023-03-08 DIAGNOSIS — D49.0: ICD-10-CM

## 2023-03-08 PROCEDURE — 74011000636 HC RX REV CODE- 636: Performed by: SURGERY

## 2023-03-08 PROCEDURE — 74177 CT ABD & PELVIS W/CONTRAST: CPT

## 2023-03-08 RX ADMIN — IOPAMIDOL 100 ML: 755 INJECTION, SOLUTION INTRAVENOUS at 13:36

## 2023-03-14 ENCOUNTER — HOSPITAL ENCOUNTER (OUTPATIENT)
Dept: MRI IMAGING | Age: 78
Discharge: HOME | End: 2023-03-14
Attending: SURGERY
Payer: MEDICARE

## 2023-03-14 DIAGNOSIS — D49.0 SPLEEN NEOPLASM: ICD-10-CM

## 2023-03-14 PROCEDURE — 72197 MRI PELVIS W/O & W/DYE: CPT

## 2023-03-14 PROCEDURE — A9576 INJ PROHANCE MULTIPACK: HCPCS | Performed by: RADIOLOGY

## 2023-03-14 PROCEDURE — 74011250636 HC RX REV CODE- 250/636: Performed by: RADIOLOGY

## 2023-03-14 RX ADMIN — GADOTERIDOL 17 ML: 279.3 INJECTION, SOLUTION INTRAVENOUS at 15:51

## 2023-03-22 ENCOUNTER — OFFICE VISIT (OUTPATIENT)
Dept: ONCOLOGY | Age: 78
End: 2023-03-22
Payer: MEDICARE

## 2023-03-22 VITALS
HEART RATE: 75 BPM | TEMPERATURE: 97.8 F | RESPIRATION RATE: 17 BRPM | WEIGHT: 188 LBS | HEIGHT: 67 IN | SYSTOLIC BLOOD PRESSURE: 123 MMHG | OXYGEN SATURATION: 98 % | DIASTOLIC BLOOD PRESSURE: 65 MMHG | BODY MASS INDEX: 29.51 KG/M2

## 2023-03-22 DIAGNOSIS — C20 RECTAL ADENOCARCINOMA (HCC): Primary | ICD-10-CM

## 2023-03-22 PROCEDURE — G8510 SCR DEP NEG, NO PLAN REQD: HCPCS | Performed by: STUDENT IN AN ORGANIZED HEALTH CARE EDUCATION/TRAINING PROGRAM

## 2023-03-22 PROCEDURE — G8427 DOCREV CUR MEDS BY ELIG CLIN: HCPCS | Performed by: STUDENT IN AN ORGANIZED HEALTH CARE EDUCATION/TRAINING PROGRAM

## 2023-03-22 PROCEDURE — G8417 CALC BMI ABV UP PARAM F/U: HCPCS | Performed by: STUDENT IN AN ORGANIZED HEALTH CARE EDUCATION/TRAINING PROGRAM

## 2023-03-22 PROCEDURE — 1101F PT FALLS ASSESS-DOCD LE1/YR: CPT | Performed by: STUDENT IN AN ORGANIZED HEALTH CARE EDUCATION/TRAINING PROGRAM

## 2023-03-22 PROCEDURE — 99205 OFFICE O/P NEW HI 60 MIN: CPT | Performed by: STUDENT IN AN ORGANIZED HEALTH CARE EDUCATION/TRAINING PROGRAM

## 2023-03-22 PROCEDURE — 1123F ACP DISCUSS/DSCN MKR DOCD: CPT | Performed by: STUDENT IN AN ORGANIZED HEALTH CARE EDUCATION/TRAINING PROGRAM

## 2023-03-22 PROCEDURE — G0463 HOSPITAL OUTPT CLINIC VISIT: HCPCS | Performed by: STUDENT IN AN ORGANIZED HEALTH CARE EDUCATION/TRAINING PROGRAM

## 2023-03-22 PROCEDURE — G8536 NO DOC ELDER MAL SCRN: HCPCS | Performed by: STUDENT IN AN ORGANIZED HEALTH CARE EDUCATION/TRAINING PROGRAM

## 2023-03-22 RX ORDER — DICLOFENAC SODIUM 10 MG/G
GEL TOPICAL
COMMUNITY
Start: 2022-09-19

## 2023-03-22 RX ORDER — MELOXICAM 15 MG/1
TABLET ORAL
COMMUNITY
Start: 2022-12-29

## 2023-03-22 NOTE — PROGRESS NOTES
[facility] [address] [facility] [address]  W: [phone] F: [phone]      Reason for Visit:   Nawaf Pineda is a 76 y.o. male who is seen in consultation at the request of Dr. Bernie Mijares for evaluation of rectal adenocarcinoma. Hematology / Oncology Treatment History:     Hematological/Oncological Diagnosis: Rectal adenocarcinoma cStage 1, T2N0M0  Date of Diagnosis:     Treatment course: pending    Pathology:   ==========================================================================   * * *FINAL PATHOLOGIC DIAGNOSIS* * *     Anal mass, transanal mucosal resection:        Invasive adenocarcinoma with mucinous features, arising in a   tubulovillous adenoma. See synoptic report and comment.      COLON AND RECTUM: Resection, Including Transanal Disk Excision of      Rectal Neoplasms    SPECIMEN       Procedure: Transanal disk excision (local excision)    TUMOR       Tumor Site: Rectum       Histologic Type: Adenocarcinoma with mucinous features       Histologic Grade: G2: Moderately differentiated       Tumor Size: 2.3 cm       Tumor Extension: Tumor invades muscularis propria       Macroscopic Tumor Perforation: Not identified       Lymphovascular Invasion: Present       Perineural Invasion: Not identified       Treatment Effect: No known presurgical therapy    MARGINS       Margins: Transanal Disk Excision Specimen           Deep Margin: Involved by invasive carcinoma           Mucosal Margin: Involved by invasive carcinoma; distal tip and   right and left lateral margins               Status of Non-Invasive Tumor at Mucosal Margin:    LYMPH NODES       Regional Lymph Nodes: No lymph nodes submitted or found       Tumor Deposits: Not identified    PATHOLOGIC STAGE CLASSIFICATION (pTNM, AJCC 8th Edition)       Primary Tumor (pT): pT2       Regional Lymph Nodes (pN): pNX       Results-Comments IMMUNOHISTOCHEMISTRY - DNA Mismatch Repair Protein Expression     Results:   MLH-1      No loss of expression             Extent: 3+; Intensity: Strong       MSH-2      No loss of expression             Extent: 3+; Intensity: Strong       MSH-6      No loss of expression             Extent: 3+; Intensity: Strong     PMS2           No loss of expression             Extent: 3+; Intensity: Strong       History of Present Illness:     Very pleasant 64-year-old male with a relevant medical history most significant for factor V Leiden, arthritis, anxiety, presents for evaluation and treatment of recently discovered rectal adenocarcinoma that was surgically resected with a transanal disc excision on February 28, 2023. Unfortunately, the patient had residual margins that were positive as well as evidence of lymphovascular invasion. Clinically, the patient denies any significant symptoms of any residual rectal pain or rectal bleeding. On interview, the patient has a good functional status reporting that he ambulates without limitation throughout the day, and is responsible for all of his own activities of daily living. He is active and works out every few days. He denies any bone pain, abdominal pain, severe constipation, diarrhea, blood in his stool, or any other constitutional symptoms of be concerning for systemic disease. Family history and social history reviewed, noncontributory    Review of Systems: A complete review of systems was obtained, negative except as described above.     Past Medical History:   Diagnosis Date    Arthritis     Factor V Leiden ([address]     High cholesterol     History of colon polyps       Past Surgical History:   Procedure Laterality Date    COLONOSCOPY N/A 1/10/2023    COLONOSCOPY performed by Diana Kidd MD at [facility] ENDOSCOPY    HX GI      HX HEMORRHOIDECTOMY      HX ORTHOPAEDIC      toe    HX TONSILLECTOMY      HX WISDOM TEETH EXTRACTION        Social History     Tobacco Use Smoking status: Never    Smokeless tobacco: Never   Substance Use Topics    Alcohol use: No      Family History   Problem Relation Age of Onset    Stroke Mother      Current Outpatient Medications   Medication Sig    diclofenac (VOLTAREN) 1 % gel APPLY 2 GM TO AFFECTED AREA FOUR TIMES A DAY AS NEEDED FOR PAIN    aspirin delayed-release 81 mg tablet Take 81 mg by mouth daily. psyllium husk (METAMUCIL PO) Take  by mouth three (3) times daily. polyethylene glycol (MIRALAX) 17 gram/dose powder Take 17 g by mouth three (3) times daily. naproxen sodium (NAPROSYN) 220 mg tablet Take 440 mg by mouth two (2) times daily (with meals). famotidine (PEPCID) 10 mg tablet Take 20 mg by mouth Every morning. rosuvastatin (CRESTOR) 40 mg tablet Take 20 mg by mouth nightly. DULoxetine (CYMBALTA) 30 mg capsule Take 60 mg by mouth nightly. Takes 2 tabs at night    ALPRAZolam (XANAX) 0.5 mg tablet Take 0.5 mg by mouth nightly as needed. Takes 1 and 1/2 tabs a day    multivitamin (ONE A DAY) tablet Take 1 Tab by mouth daily. meloxicam (MOBIC) 15 mg tablet TAKE 1 TABLET (15 MG TOTAL) BY MOUTH DAILY. (Patient not taking: Reported on 3/22/2023)     No current facility-administered medications for this visit. No Known Allergies         Physical Exam:   Visit Vitals  /65 (BP 1 Location: Left upper arm, BP Patient Position: Sitting)   Pulse 75   Temp 97.8 °F (36.6 °C) (Oral)   Resp 17   Ht 5' 7\" (1.702 m)   Wt 188 lb (85.3 kg)   SpO2 98%   BMI 29.44 kg/m²     ECOG PS: 0  General: No distress  Eyes: PERRLA, anicteric sclerae  HENT: Atraumatic with normal appearance of ears and nose; OP clear  Neck: Supple; no visualized JVD  Lymphatic: No cervical, or supraclavicular lymphadenopathy.     Respiratory: CTAB, normal respiratory effort  CV: Normal rate, regular rhythm, no murmurs, no peripheral edema  GI: Soft, nontender, nondistended, no palpable masses, no hepatomegaly, no splenomegaly  MS: Normal gait and station. Digits without clubbing or cyanosis. Skin: No rashes, ecchymoses, or petechiae. Normal temperature, turgor, and texture. Neuro/Psych: Alert, oriented, appropriate affect, normal judgment/insight      Results:   CT Results (most recent):  Results from Hospital Encounter encounter on 03/08/23    CT CHEST ABD PELV W CONT    Narrative  EXAM: CT CHEST ABD PELV W CONT    INDICATION: Rectal carcinoma    COMPARISON: None    IV CONTRAST: 100 mL of Isovue-370. ORAL CONTRAST: Was administered to improve bowel recognition and diagnosis in  this case. TECHNIQUE:  Following the uneventful intravenous administration of contrast, thin axial  images were obtained through the chest, abdomen and pelvis. Coronal and sagittal  reformats were generated. CT dose reduction was achieved through use of a  standardized protocol tailored for this examination and automatic exposure  control for dose modulation. FINDINGS:    CHEST WALL: No mass or axillary lymphadenopathy. THYROID: No nodule. MEDIASTINUM: No mass or lymphadenopathy. LITZY: No mass or lymphadenopathy. THORACIC AORTA: No dissection or aneurysm. MAIN PULMONARY ARTERY: Normal in caliber. TRACHEA/BRONCHI: Patent. ESOPHAGUS: No wall thickening or dilatation. HEART: Normal in size. Coronary artery calcium: absent  PLEURA: No effusion or pneumothorax. LUNGS: No nodule, mass, or airspace disease. LIVER: No mass. BILIARY TREE: Gallbladder is within normal limits. CBD is not dilated. SPLEEN: within normal limits. PANCREAS: No mass or ductal dilatation. ADRENALS: Unremarkable. KIDNEYS: No mass, , or hydronephrosis. Right intrarenal calculus. STOMACH: Unremarkable. SMALL BOWEL: No dilatation or wall thickening. COLON: No dilatation or wall thickening. Mild fecal stasis. APPENDIX:  PERITONEUM: No ascites or pneumoperitoneum. RETROPERITONEUM: No lymphadenopathy or aortic aneurysm. URINARY BLADDER: No mass or calculus.   BONES: No destructive bone lesion. ABDOMINAL WALL: No mass or hernia. ADDITIONAL COMMENTS: N/A    Impression  1. No acute abnormalities. 2. No definite evidence to suggest metastases in the chest abdomen and pelvis. 3. Few incidentals as above. Assessment and Recommendations:     # Rectal adenocarcinoma, Clinical Stage I, T2N0M0    - High risk features noted on pathology including positive margins, as well as lymphovascular invasion. - MMR expression intact  -Given the extremely distal nature of the patient's rectal adenocarcinoma, there is significant concern for integrity of his anal sphincter should he proceed with any kind of surgical resection.      -Today I had a extensive discussion with the patient as well as his family about treatment options for the management of high risk early stage rectal adenocarcinoma with positive margins with concern for anal sphincter preservation. We discussed that while the standard treatment in this case would include low anterior resection which would compromise the patient's anal sphincter and required to have a permanent colostomy, additional options including concurrent chemotherapy with radiation, as well as the possibility of short course RT alone, are possible.      -On review of imaging, no clear distant metastatic disease is seen on CT chest abdomen and pelvis. MRI of the pelvis does not show any clear evidence of lymph node involvement. As no pathological sampling of lymph nodes are available, and pathology showed lymphovascular invasion, pursuing PET scan would be prudent in this case to evaluate for local metastatic PET avid lymphadenopathy. Will order stat PET scan today.    -I contacted Dr. Evelyn Abdalla office of radiation oncology to review imaging and discuss treatment planning.   Awaiting callback.    -Total time spent with patient and their family, as well as care coordination greater than 60 minutes.    -Plan for follow-up in about 2 weeks to review PET scan, review treatment plan and obtain consent for start of treatment.       Signed By: Marie Spicer MD      Attending Medical Oncologist   [facility]

## 2023-03-22 NOTE — PROGRESS NOTES
Hood Person is a 76 y.o. male who presents for   Chief Complaint   Patient presents with    New Patient     Anal Mass       The patient reports he is feeling well and has factor 5 blood. Pt denies constipation because he takes the maksim  lax, pt denies abnormal bleeding in stools. Pt has an apt with radiation on April 4th pt would like it moved up. No interval surgery or procedures reported    No reported new medication changes reported       Medications reviewed with the patient, and chart updated to reflect changes.

## 2023-03-23 ENCOUNTER — TELEPHONE (OUTPATIENT)
Dept: ONCOLOGY | Age: 78
End: 2023-03-23

## 2023-03-23 NOTE — TELEPHONE ENCOUNTER
[facility] will see him next Tuesday Please fax order [phone] and then call patient to confirm.

## 2023-03-23 NOTE — TELEPHONE ENCOUNTER
Patient called and his scans cannot get done until 4/18. Orders say within the week. He has Medicare and can go to another facility as was suggested by the provider. PSR asked him to check and see if he could get a fax number from a location he prefers. Please call to discuss.

## 2023-03-27 ENCOUNTER — HOSPITAL ENCOUNTER (OUTPATIENT)
Dept: PET IMAGING | Age: 78
Discharge: HOME | End: 2023-03-27
Attending: STUDENT IN AN ORGANIZED HEALTH CARE EDUCATION/TRAINING PROGRAM
Payer: MEDICARE

## 2023-03-27 DIAGNOSIS — C20 RECTAL ADENOCARCINOMA (HCC): ICD-10-CM

## 2023-03-27 LAB
GLUCOSE BLD STRIP.AUTO-MCNC: 95 MG/DL (ref 65–117)
SERVICE CMNT-IMP: NORMAL

## 2023-03-27 PROCEDURE — A9552 F18 FDG: HCPCS

## 2023-03-27 RX ORDER — FLUDEOXYGLUCOSE F-18 200 MCI/ML
10 INJECTION INTRAVENOUS ONCE
Status: COMPLETED | OUTPATIENT
Start: 2023-03-27 | End: 2023-03-27

## 2023-03-27 RX ADMIN — FLUDEOXYGLUCOSE F-18 10 MILLICURIE: 200 INJECTION INTRAVENOUS at 13:10

## 2023-04-05 ENCOUNTER — DOCUMENTATION ONLY (OUTPATIENT)
Dept: ONCOLOGY | Age: 78
End: 2023-04-05

## 2023-04-05 ENCOUNTER — OFFICE VISIT (OUTPATIENT)
Dept: ONCOLOGY | Age: 78
End: 2023-04-05
Payer: MEDICARE

## 2023-04-05 PROCEDURE — G0463 HOSPITAL OUTPT CLINIC VISIT: HCPCS | Performed by: STUDENT IN AN ORGANIZED HEALTH CARE EDUCATION/TRAINING PROGRAM

## 2023-04-05 PROCEDURE — G8536 NO DOC ELDER MAL SCRN: HCPCS | Performed by: STUDENT IN AN ORGANIZED HEALTH CARE EDUCATION/TRAINING PROGRAM

## 2023-04-05 PROCEDURE — G8417 CALC BMI ABV UP PARAM F/U: HCPCS | Performed by: STUDENT IN AN ORGANIZED HEALTH CARE EDUCATION/TRAINING PROGRAM

## 2023-04-05 PROCEDURE — 99214 OFFICE O/P EST MOD 30 MIN: CPT | Performed by: STUDENT IN AN ORGANIZED HEALTH CARE EDUCATION/TRAINING PROGRAM

## 2023-04-05 PROCEDURE — G8432 DEP SCR NOT DOC, RNG: HCPCS | Performed by: STUDENT IN AN ORGANIZED HEALTH CARE EDUCATION/TRAINING PROGRAM

## 2023-04-05 PROCEDURE — 1123F ACP DISCUSS/DSCN MKR DOCD: CPT | Performed by: STUDENT IN AN ORGANIZED HEALTH CARE EDUCATION/TRAINING PROGRAM

## 2023-04-05 PROCEDURE — G8427 DOCREV CUR MEDS BY ELIG CLIN: HCPCS | Performed by: STUDENT IN AN ORGANIZED HEALTH CARE EDUCATION/TRAINING PROGRAM

## 2023-04-05 PROCEDURE — 1101F PT FALLS ASSESS-DOCD LE1/YR: CPT | Performed by: STUDENT IN AN ORGANIZED HEALTH CARE EDUCATION/TRAINING PROGRAM

## 2023-04-05 RX ORDER — CAPECITABINE 150 MG/1
300 TABLET, FILM COATED ORAL 2 TIMES DAILY
Qty: 120 TABLET | Refills: 0 | Status: ACTIVE
Start: 2023-04-05

## 2023-04-05 RX ORDER — PROCHLORPERAZINE MALEATE 10 MG
5 TABLET ORAL
Qty: 20 TABLET | Refills: 2 | Status: SHIPPED
Start: 2023-04-05

## 2023-04-05 RX ORDER — CAPECITABINE 500 MG/1
1500 TABLET, FILM COATED ORAL 2 TIMES DAILY
Qty: 180 TABLET | Refills: 0 | Status: ACTIVE
Start: 2023-04-05

## 2023-04-05 RX ORDER — ONDANSETRON 4 MG/1
4 TABLET, ORALLY DISINTEGRATING ORAL
Qty: 20 TABLET | Refills: 2 | Status: SHIPPED
Start: 2023-04-05

## 2023-04-05 NOTE — PROGRESS NOTES
Oncology Social Work  Psychosocial Assessment    Reason for Assessment:      [] Social Work Referral [x] Initial Assessment [x] New Diagnosis [] Other    Advance Care Planning:  Advance Care Planning 2/23/2023   Confirm Advance Directive None   Patient Would Like to Complete Advance Directive No       Sources of Information:    [x]Patient  [x]Family  []Staff  []Medical Record    Mental Status:    [x]Alert  []Lethargic  []Unresponsive   [] Unable to assess   Oriented to:  [x]Person  [x]Place  [x]Time  [x]Situation      Relationship Status:  []Single  [x]  []Significant Other/Life Partner  []  []  []    Living Circumstances:  []Lives Alone  [x]Family/Significant Other in Household  []Roommates  []Children in the Home  []Paid Caregivers  []Assisted Living Facility/Group [address]  []Homeless  []Incarcerated  []Environmental/Care Concerns  []Other:    Employment Status:  []Employed Full-time [x]Employed Part-time/[employer] []Homemaker  [x] Retired [] Short-Term Disability [] Long-Term Disability  [] Unemployed   [] [place]   [] SSDI  [] Self-Employment    Barriers to Learning:    []Language  []Developmental  []Cognitive  []Altered Mental Status  []Visual/Hearing Impairment  []Unable to Read/Write  []Motivational  [] Challenges Understanding Medical Jargon [x]No Barriers Identified      Financial/Legal Concerns:    []Uninsured  []Limited Income/Resources  []Non-Citizen  []Food Insecurity [x]No Concerns Identified   []Other:    Baptist/Spiritual/Existential:  Does patient have any spiritual or Zoroastrianism beliefs? [x] Yes [] No  Is the patient involved in a spiritual, manfred or Zoroastrianism community?  [x] Yes [] No  Patient expressing spiritual/existential angst? [] Yes [x] No  Notes:    Support System:    Identified Support Person/Group:  []Strong  [x]Fair  []Limited    Coping with Illness:   [x]  Coping Well  [] Challenges Coping with Serious Illness [] Situational Depression [] Situational Anxiety [] Anticipatory Grief  [] Recent Loss [] Caregiver [place]            Narrative: Met with patient  and his wife, Zayda Dunbar,  to introduce social work navigator role and supports. Couple have 2 children in [address] [place] [address] Street.   47 yo dtr who is retired. Couple have been  52.5 years. Wife stating she is not coping well and not sleeping well/worried. Couple are [employer]. 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 Plan:   Introduce self and role of the  in the [employer]. Informed the patient of the [facility] and available resources there. Continue to meet with the patient when he returns to the clinic for ongoing assessment of the patient's adjustment to his diagnosis and treatment. Ongoing psychosocial support as desired by patient. Referral/Handouts:       Complementary therapies referral  Insurance/Entitlements referral  Financial/Medication assistance referral         Steven Calderon.  BRANDT Valle/DYLAN  Supervisee in Social Work

## 2023-04-05 NOTE — PROGRESS NOTES
Pharmacy Note- Chemotherapy Education    Darci Jacobs is a  76 y.o.male  diagnosed with rectal cancer here today for chemotherapy counseling and consent. Mr. Manuel Sanabria is being treated with Xeloda plus Radiation. Provided education on capcitabine (Xeloda)    Side effects of chemotherapy reviewed included s/s infection, anemia, appetite changes, thrombocytopenia, fatigue, and hand and foot syndrome. Patient given ways to manage these side effects and when to contact office. [employer] Handout of medications provided to patient. Mr. Manuel Sanabria verbalized understanding of the information presented, consent signed, and all of the patient's questions were answered.     Rossi Rodrigues, PharmD, [address] [place] Only    Program: Medical Group  CPA in place: Yes  Time Spent (min): 30

## 2023-04-05 NOTE — PROGRESS NOTES
[facility] [place] at [address] [facility] [address] [facility]  W: [phone] F: [phone]      Reason for Visit:   Jose Angel Tse is a 76 y.o. male who is seen in consultation at the request of Dr. Lucy Moya for evaluation of rectal adenocarcinoma. Hematology / Oncology Treatment History:     Hematological/Oncological Diagnosis: Rectal adenocarcinoma cStage 1, T2N0M0  Date of Diagnosis:     Treatment course: pending    Pathology:   ==========================================================================   * * *FINAL PATHOLOGIC DIAGNOSIS* * *     Anal mass, transanal mucosal resection:        Invasive adenocarcinoma with mucinous features, arising in a   tubulovillous adenoma. See synoptic report and comment.      COLON AND RECTUM: Resection, Including Transanal Disk Excision of      Rectal Neoplasms    SPECIMEN       Procedure: Transanal disk excision (local excision)    TUMOR       Tumor Site: Rectum       Histologic Type: Adenocarcinoma with mucinous features       Histologic Grade: G2: Moderately differentiated       Tumor Size: 2.3 cm       Tumor Extension: Tumor invades muscularis propria       Macroscopic Tumor Perforation: Not identified       Lymphovascular Invasion: Present       Perineural Invasion: Not identified       Treatment Effect: No known presurgical therapy    MARGINS       Margins: Transanal Disk Excision Specimen           Deep Margin: Involved by invasive carcinoma           Mucosal Margin: Involved by invasive carcinoma; distal tip and   right and left lateral margins               Status of Non-Invasive Tumor at Mucosal Margin:    LYMPH NODES       Regional Lymph Nodes: No lymph nodes submitted or found       Tumor Deposits: Not identified    PATHOLOGIC STAGE CLASSIFICATION (pTNM, AJCC 8th Edition)       Primary Tumor (pT): pT2       Regional Lymph Nodes (pN): pNX       Results-Comments IMMUNOHISTOCHEMISTRY - DNA Mismatch Repair Protein Expression     Results:   MLH-1      No loss of expression             Extent: 3+; Intensity: Strong       MSH-2      No loss of expression             Extent: 3+; Intensity: Strong       MSH-6      No loss of expression             Extent: 3+; Intensity: Strong     PMS2           No loss of expression             Extent: 3+; Intensity: Strong       History of Present Illness:     Very pleasant 68-year-old male with a relevant medical history most significant for factor V Leiden, arthritis, anxiety, presents for evaluation and treatment of recently discovered rectal adenocarcinoma that was surgically resected with a transanal disc excision on February 28, 2023. Unfortunately, the patient had residual margins that were positive as well as evidence of lymphovascular invasion. Clinically, the patient denies any significant symptoms of any residual rectal pain or rectal bleeding. On interview, the patient has a good functional status reporting that he ambulates without limitation throughout the day, and is responsible for all of his own activities of daily living. He is active and works out every few days. He denies any bone pain, abdominal pain, severe constipation, diarrhea, blood in his stool, or any other constitutional symptoms of be concerning for systemic disease. Family history and social history reviewed, noncontributory    Review of Systems: A complete review of systems was obtained, negative except as described above. Interval History:     04/05/23    Doing well, no new clinical worsening or complaints.       Past Medical History:   Diagnosis Date    Arthritis     Factor V Leiden ([address]     High cholesterol     History of colon polyps       Past Surgical History:   Procedure Laterality Date    COLONOSCOPY N/A 1/10/2023    COLONOSCOPY performed by Nick Alfonso MD at [facility] ENDOSCOPY    HX GI      HX HEMORRHOIDECTOMY      HX ORTHOPAEDIC toe    HX TONSILLECTOMY      HX WISDOM TEETH EXTRACTION        Social History     Tobacco Use    Smoking status: Never    Smokeless tobacco: Never   Substance Use Topics    Alcohol use: No      Family History   Problem Relation Age of Onset    Stroke Mother      Current Outpatient Medications   Medication Sig    diclofenac (VOLTAREN) 1 % gel APPLY 2 GM TO AFFECTED AREA FOUR TIMES A DAY AS NEEDED FOR PAIN    aspirin delayed-release 81 mg tablet Take 1 Tablet by mouth daily. psyllium husk (METAMUCIL PO) Take  by mouth three (3) times daily. polyethylene glycol (MIRALAX) 17 gram/dose powder Take 17 g by mouth three (3) times daily. naproxen sodium (NAPROSYN) 220 mg tablet Take 2 Tablets by mouth two (2) times daily (with meals). famotidine (PEPCID) 10 mg tablet Take 2 Tablets by mouth Every morning. rosuvastatin (CRESTOR) 40 mg tablet Take 0.5 Tablets by mouth nightly. DULoxetine (CYMBALTA) 30 mg capsule Take 2 Capsules by mouth nightly. Takes 2 tabs at night    ALPRAZolam (XANAX) 0.5 mg tablet Take 1 Tablet by mouth nightly as needed. Takes 1 and 1/2 tabs a day    multivitamin (ONE A DAY) tablet Take 1 Tablet by mouth daily. meloxicam (MOBIC) 15 mg tablet TAKE 1 TABLET (15 MG TOTAL) BY MOUTH DAILY. (Patient not taking: Reported on 3/22/2023)     No current facility-administered medications for this visit.       No Known Allergies         Physical Exam:   Visit Vitals  /70 (BP 1 Location: Left upper arm, BP Patient Position: Sitting)   Pulse 75   Temp 98.2 °F (36.8 °C) (Temporal)   Ht 5' 7\" (1.702 m)   Wt 191 lb 6.4 oz (86.8 kg)   SpO2 96%   BMI 29.98 kg/m²     ECOG PS: 0  General: alert, cooperative, no distress   Mental  status: normal mood, behavior, speech, dress, motor activity, and thought processes, able to follow commands   HENT: NCAT   Neck: no visualized mass   Resp: no respiratory distress   Neuro: no gross deficits   Skin: no discoloration or lesions of concern on visible areas Psychiatric: normal affect, consistent with stated mood, no evidence of hallucinations           Results:   CT Results (most recent):  Results from Hospital Encounter encounter on 03/08/23    CT CHEST ABD PELV W CONT    Narrative  EXAM: CT CHEST ABD PELV W CONT    INDICATION: Rectal carcinoma    COMPARISON: None    IV CONTRAST: 100 mL of Isovue-370. ORAL CONTRAST: Was administered to improve bowel recognition and diagnosis in  this case. TECHNIQUE:  Following the uneventful intravenous administration of contrast, thin axial  images were obtained through the chest, abdomen and pelvis. Coronal and sagittal  reformats were generated. CT dose reduction was achieved through use of a  standardized protocol tailored for this examination and automatic exposure  control for dose modulation. FINDINGS:    CHEST WALL: No mass or axillary lymphadenopathy. THYROID: No nodule. MEDIASTINUM: No mass or lymphadenopathy. LITZY: No mass or lymphadenopathy. THORACIC AORTA: No dissection or aneurysm. MAIN PULMONARY ARTERY: Normal in caliber. TRACHEA/BRONCHI: Patent. ESOPHAGUS: No wall thickening or dilatation. HEART: Normal in size. Coronary artery calcium: absent  PLEURA: No effusion or pneumothorax. LUNGS: No nodule, mass, or airspace disease. LIVER: No mass. BILIARY TREE: Gallbladder is within normal limits. CBD is not dilated. SPLEEN: within normal limits. PANCREAS: No mass or ductal dilatation. ADRENALS: Unremarkable. KIDNEYS: No mass, , or hydronephrosis. Right intrarenal calculus. STOMACH: Unremarkable. SMALL BOWEL: No dilatation or wall thickening. COLON: No dilatation or wall thickening. Mild fecal stasis. APPENDIX:  PERITONEUM: No ascites or pneumoperitoneum. RETROPERITONEUM: No lymphadenopathy or aortic aneurysm. URINARY BLADDER: No mass or calculus. BONES: No destructive bone lesion. ABDOMINAL WALL: No mass or hernia. ADDITIONAL COMMENTS: N/A    Impression  1. No acute abnormalities.   2. No definite evidence to suggest metastases in the chest abdomen and pelvis. 3. Few incidentals as above. Assessment and Recommendations:     # Rectal adenocarcinoma, Clinical Stage I, T2N0M0    - High risk features noted on pathology including positive margins, as well as lymphovascular invasion. - MMR expression intact  -Given the extremely distal nature of the patient's rectal adenocarcinoma, there is significant concern for integrity of his anal sphincter should he proceed with any kind of surgical resection.      - PET scan was completed, shows no evidence of distant disease or any evidence of pelvic lymph node involvement    - plan for definitive chemoRT with 6 weeks of radiation therapy with concurrent capecitabine chemotherapy 825 mg/m2 BID. We discussed the risks and benefits of capecitabine chemotherapy, including potential side effects. These include but are not limited to fatigue, nausea, vomiting, diarrhea, cardiac damage, taste changes, allergic reactions, mucositis, myelosuppression, risk for infection, skin changes, infertility, and rarely, death. Rarely, a patient may have a condition where they do not metabolize capecitabine appropriately (called DPD deficiency), and they may have excessive toxicity. The patient has consented to beginning chemotherapy. Plan for concurrent RT with radiation with Dr. Martha Florence  On the first day of radiation, begin chemotherapy with Capecitabine (825 mg/m2 bid) M-F during radiation  Labs: CBC, CMP every 2 weeks while on chemoRT  PRN antiemetics: Prochlorperazine and Ondansetron  Return to clinic on Day 1 of RT therapy.              Signed By: Chris Carrillo MD      Attending Medical Oncologist   [facility]

## 2023-04-05 NOTE — PROGRESS NOTES
Estefania Matthews is a 76 y.o. male here for follow up for rectal adenocarcinoma. Mapping will be next Tuesday for radiation. 1. Have you been to the ER, urgent care clinic since your last visit? Hospitalized since your last visit? no    2. Have you seen or consulted any other health care providers outside of the [address] [place] [address] since your last visit? Include any pap smears or colon screening.  U Dr Frederick Morel

## 2023-04-13 ENCOUNTER — TELEPHONE (OUTPATIENT)
Dept: ONCOLOGY | Age: 78
End: 2023-04-13

## 2023-04-17 ENCOUNTER — TELEPHONE (OUTPATIENT)
Dept: ONCOLOGY | Age: 78
End: 2023-04-17

## 2023-04-17 NOTE — TELEPHONE ENCOUNTER
Return call placed to pt. Pt informed nurse he received the nausea medications. Pt asked nurse if ok to take nausea medications prior to taking oral chemo and radiation. Pt advised not to take the nausea medication the first time to see how he feels, but if he feels he need the nausea medication on the 1st day he take the chemo pills he can take the medication interchangeably. Pt verbalized understanding.     Pt informed ok to take medication w/ food

## 2023-04-17 NOTE — TELEPHONE ENCOUNTER
Pt was calling to speak to someone about his pharmacy prescription questions. He had said he was waiting for Constantin Glover or a nurse to call him back.     Thank you

## 2023-04-24 ENCOUNTER — DOCUMENTATION ONLY (OUTPATIENT)
Dept: ONCOLOGY | Age: 78
End: 2023-04-24

## 2023-04-27 ENCOUNTER — OFFICE VISIT (OUTPATIENT)
Dept: ONCOLOGY | Age: 78
End: 2023-04-27
Payer: MEDICARE

## 2023-04-27 VITALS
HEART RATE: 73 BPM | HEIGHT: 67 IN | SYSTOLIC BLOOD PRESSURE: 126 MMHG | RESPIRATION RATE: 16 BRPM | DIASTOLIC BLOOD PRESSURE: 73 MMHG | OXYGEN SATURATION: 97 % | BODY MASS INDEX: 30.13 KG/M2 | TEMPERATURE: 97.8 F | WEIGHT: 192 LBS

## 2023-04-27 DIAGNOSIS — C20 RECTAL ADENOCARCINOMA (HCC): Primary | ICD-10-CM

## 2023-04-27 DIAGNOSIS — C20 MALIGNANT NEOPLASM OF RECTUM (HCC): Primary | ICD-10-CM

## 2023-04-27 DIAGNOSIS — Z51.11 ENCOUNTER FOR ANTINEOPLASTIC CHEMOTHERAPY: ICD-10-CM

## 2023-04-27 PROCEDURE — 99215 OFFICE O/P EST HI 40 MIN: CPT | Performed by: STUDENT IN AN ORGANIZED HEALTH CARE EDUCATION/TRAINING PROGRAM

## 2023-04-27 PROCEDURE — 1101F PT FALLS ASSESS-DOCD LE1/YR: CPT | Performed by: STUDENT IN AN ORGANIZED HEALTH CARE EDUCATION/TRAINING PROGRAM

## 2023-04-27 PROCEDURE — G8417 CALC BMI ABV UP PARAM F/U: HCPCS | Performed by: STUDENT IN AN ORGANIZED HEALTH CARE EDUCATION/TRAINING PROGRAM

## 2023-04-27 PROCEDURE — 1123F ACP DISCUSS/DSCN MKR DOCD: CPT | Performed by: STUDENT IN AN ORGANIZED HEALTH CARE EDUCATION/TRAINING PROGRAM

## 2023-04-27 PROCEDURE — G8510 SCR DEP NEG, NO PLAN REQD: HCPCS | Performed by: STUDENT IN AN ORGANIZED HEALTH CARE EDUCATION/TRAINING PROGRAM

## 2023-04-27 PROCEDURE — G0463 HOSPITAL OUTPT CLINIC VISIT: HCPCS | Performed by: STUDENT IN AN ORGANIZED HEALTH CARE EDUCATION/TRAINING PROGRAM

## 2023-04-27 PROCEDURE — G8536 NO DOC ELDER MAL SCRN: HCPCS | Performed by: STUDENT IN AN ORGANIZED HEALTH CARE EDUCATION/TRAINING PROGRAM

## 2023-04-27 PROCEDURE — G8427 DOCREV CUR MEDS BY ELIG CLIN: HCPCS | Performed by: STUDENT IN AN ORGANIZED HEALTH CARE EDUCATION/TRAINING PROGRAM

## 2023-04-27 RX ORDER — OXYCODONE HYDROCHLORIDE 5 MG/1
TABLET ORAL
COMMUNITY
Start: 2023-02-28

## 2023-04-27 RX ORDER — FAMOTIDINE 10 MG/1
TABLET ORAL
COMMUNITY
Start: 2023-02-21

## 2023-04-27 NOTE — PROGRESS NOTES
[facility]cer [place] at [address] [facility] [address] [facility]  W: [phone] F: [phone]      Reason for Visit:   Magdy Phillips is a 76 y.o. male who is seen in consultation at the request of Dr. Emma Sorto for evaluation of rectal adenocarcinoma. Hematology / Oncology Treatment History:     Hematological/Oncological Diagnosis: Rectal adenocarcinoma cStage 1, T2N0M0  Date of Diagnosis:     Treatment course: concurrent chemoRT with capecitabine     Pathology:   ==========================================================================   * * *FINAL PATHOLOGIC DIAGNOSIS* * *     Anal mass, transanal mucosal resection:        Invasive adenocarcinoma with mucinous features, arising in a   tubulovillous adenoma. See synoptic report and comment.      COLON AND RECTUM: Resection, Including Transanal Disk Excision of      Rectal Neoplasms    SPECIMEN       Procedure: Transanal disk excision (local excision)    TUMOR       Tumor Site: Rectum       Histologic Type: Adenocarcinoma with mucinous features       Histologic Grade: G2: Moderately differentiated       Tumor Size: 2.3 cm       Tumor Extension: Tumor invades muscularis propria       Macroscopic Tumor Perforation: Not identified       Lymphovascular Invasion: Present       Perineural Invasion: Not identified       Treatment Effect: No known presurgical therapy    MARGINS       Margins: Transanal Disk Excision Specimen           Deep Margin: Involved by invasive carcinoma           Mucosal Margin: Involved by invasive carcinoma; distal tip and   right and left lateral margins               Status of Non-Invasive Tumor at Mucosal Margin:    LYMPH NODES       Regional Lymph Nodes: No lymph nodes submitted or found       Tumor Deposits: Not identified    PATHOLOGIC STAGE CLASSIFICATION (pTNM, AJCC 8th Edition)       Primary Tumor (pT): pT2       Regional Lymph Nodes (pN): pNX Results-Comments   IMMUNOHISTOCHEMISTRY - DNA Mismatch Repair Protein Expression     Results:   MLH-1      No loss of expression             Extent: 3+; Intensity: Strong       MSH-2      No loss of expression             Extent: 3+; Intensity: Strong       MSH-6      No loss of expression             Extent: 3+; Intensity: Strong     PMS2           No loss of expression             Extent: 3+; Intensity: Strong       History of Present Illness:     Very pleasant 40-year-old male with a relevant medical history most significant for factor V Leiden, arthritis, anxiety, presents for evaluation and treatment of recently discovered rectal adenocarcinoma that was surgically resected with a transanal disc excision on February 28, 2023. Unfortunately, the patient had residual margins that were positive as well as evidence of lymphovascular invasion. Clinically, the patient denies any significant symptoms of any residual rectal pain or rectal bleeding. On interview, the patient has a good functional status reporting that he ambulates without limitation throughout the day, and is responsible for all of his own activities of daily living. He is active and works out every few days. He denies any bone pain, abdominal pain, severe constipation, diarrhea, blood in his stool, or any other constitutional symptoms of be concerning for systemic disease. Family history and social history reviewed, noncontributory    Review of Systems: A complete review of systems was obtained, negative except as described above. Interval History:     04/27/23    Doing well, no new clinical worsening or complaints.       Past Medical History:   Diagnosis Date    Arthritis     Factor V Leiden ([address]     High cholesterol     History of colon polyps       Past Surgical History:   Procedure Laterality Date    COLONOSCOPY N/A 1/10/2023    COLONOSCOPY performed by Vlad Bonilla MD at [facility] ENDOSCOPY    HX GI      HX HEMORRHOIDECTOMY HX ORTHOPAEDIC      toe    HX TONSILLECTOMY      HX WISDOM TEETH EXTRACTION        Social History     Tobacco Use    Smoking status: Never    Smokeless tobacco: Never   Substance Use Topics    Alcohol use: No      Family History   Problem Relation Age of Onset    Stroke Mother      Current Outpatient Medications   Medication Sig    famotidine (PEPCID) 10 mg tablet TAKE  BY MOUTH AT BEDTIME    multivitamin (MULTIPLE VITAMIN ESSENTIAL PO) Take 1 Tablet by mouth. oxyCODONE IR (ROXICODONE) 5 mg immediate release tablet PLEASE SEE ATTACHED FOR DETAILED DIRECTIONS    ondansetron (ZOFRAN ODT) 4 mg disintegrating tablet Take 1 Tablet by mouth every six (6) hours as needed for Nausea or Vomiting. prochlorperazine (COMPAZINE) 10 mg tablet Take 0.5 Tablets by mouth every six (6) hours as needed for Nausea or Vomiting. capecitabine (XELODA) 150 mg tablet 2 Tablets two (2) times a day with 1 other capecitabine prescription for 1,800 mg total. Take 3 Tablets of 500mg and 2 Tablets of 150mg Two Times Daily Monday, Tuesday, Wednesday, Thursday, Friday with Radiation    capecitabine (Xeloda) 500 mg tablet 3 Tablets two (2) times a day with 1 other capecitabine prescription for 1,800 mg total. Take 3 Tablets of 500mg and 2 Tablets of 150mg Two Times Daily Monday, Tuesday, Wednesday, Thursday, Friday with Radiation    diclofenac (VOLTAREN) 1 % gel APPLY 2 GM TO AFFECTED AREA FOUR TIMES A DAY AS NEEDED FOR PAIN    aspirin delayed-release 81 mg tablet Take 1 Tablet by mouth daily. psyllium husk (METAMUCIL PO) Take  by mouth three (3) times daily. polyethylene glycol (MIRALAX) 17 gram/dose powder Take 17 g by mouth three (3) times daily. naproxen sodium (NAPROSYN) 220 mg tablet Take 2 Tablets by mouth two (2) times daily (with meals). famotidine (PEPCID) 10 mg tablet Take 2 Tablets by mouth Every morning. rosuvastatin (CRESTOR) 40 mg tablet Take 0.5 Tablets by mouth nightly.     DULoxetine (CYMBALTA) 30 mg capsule Take 2 Capsules by mouth nightly. Takes 2 tabs at night    ALPRAZolam (XANAX) 0.5 mg tablet Take 1 Tablet by mouth nightly as needed. Takes 1 and 1/2 tabs a day    multivitamin (ONE A DAY) tablet Take 1 Tablet by mouth daily. meloxicam (MOBIC) 15 mg tablet TAKE 1 TABLET (15 MG TOTAL) BY MOUTH DAILY. (Patient not taking: Reported on 3/22/2023)     No current facility-administered medications for this visit. No Known Allergies         Physical Exam:   Visit Vitals  /73 (BP 1 Location: Left upper arm)   Pulse 73   Temp 97.8 °F (36.6 °C) (Oral)   Resp 16   Ht 5' 7\" (1.702 m)   Wt 192 lb (87.1 kg)   SpO2 97%   BMI 30.07 kg/m²     ECOG PS: 0  General: alert, cooperative, no distress   Mental  status: normal mood, behavior, speech, dress, motor activity, and thought processes, able to follow commands   HENT: NCAT   Neck: no visualized mass   Resp: no respiratory distress   Neuro: no gross deficits   Skin: no discoloration or lesions of concern on visible areas   Psychiatric: normal affect, consistent with stated mood, no evidence of hallucinations           Results:   CT Results (most recent):  Results from Hospital Encounter encounter on 03/08/23    CT CHEST ABD PELV W CONT    Narrative  EXAM: CT CHEST ABD PELV W CONT    INDICATION: Rectal carcinoma    COMPARISON: None    IV CONTRAST: 100 mL of Isovue-370. ORAL CONTRAST: Was administered to improve bowel recognition and diagnosis in  this case. TECHNIQUE:  Following the uneventful intravenous administration of contrast, thin axial  images were obtained through the chest, abdomen and pelvis. Coronal and sagittal  reformats were generated. CT dose reduction was achieved through use of a  standardized protocol tailored for this examination and automatic exposure  control for dose modulation. FINDINGS:    CHEST WALL: No mass or axillary lymphadenopathy. THYROID: No nodule. MEDIASTINUM: No mass or lymphadenopathy.   LITZY: No mass or lymphadenopathy. THORACIC AORTA: No dissection or aneurysm. MAIN PULMONARY ARTERY: Normal in caliber. TRACHEA/BRONCHI: Patent. ESOPHAGUS: No wall thickening or dilatation. HEART: Normal in size. Coronary artery calcium: absent  PLEURA: No effusion or pneumothorax. LUNGS: No nodule, mass, or airspace disease. LIVER: No mass. BILIARY TREE: Gallbladder is within normal limits. CBD is not dilated. SPLEEN: within normal limits. PANCREAS: No mass or ductal dilatation. ADRENALS: Unremarkable. KIDNEYS: No mass, , or hydronephrosis. Right intrarenal calculus. STOMACH: Unremarkable. SMALL BOWEL: No dilatation or wall thickening. COLON: No dilatation or wall thickening. Mild fecal stasis. APPENDIX:  PERITONEUM: No ascites or pneumoperitoneum. RETROPERITONEUM: No lymphadenopathy or aortic aneurysm. URINARY BLADDER: No mass or calculus. BONES: No destructive bone lesion. ABDOMINAL WALL: No mass or hernia. ADDITIONAL COMMENTS: N/A    Impression  1. No acute abnormalities. 2. No definite evidence to suggest metastases in the chest abdomen and pelvis. 3. Few incidentals as above. Assessment and Recommendations:     # Rectal adenocarcinoma, Clinical Stage I, T2N0M0    - High risk features noted on pathology including positive margins, as well as lymphovascular invasion. - MMR expression intact  -Given the extremely distal nature of the patient's rectal adenocarcinoma, there is significant concern for integrity of his anal sphincter should he proceed with any kind of surgical resection.      - PET scan was completed, shows no evidence of distant disease or any evidence of pelvic lymph node involvement    - plan for definitive chemoRT with 6 weeks of radiation therapy with concurrent capecitabine chemotherapy 825 mg/m2 BID.       Continue concurrent oral chemotherapy with radiation with Dr. Keiry Gifford  He started chemotherapy with Capecitabine (825 mg/m2 bid) M-F during radiation today on 4/27/23  Labs: CBC, CMP every 2 weeks while on chemoRT  PRN antiemetics: Prochlorperazine and Ondansetron  Return to clinic in 2 weeks. The patient is currently receiving antineoplastic chemotherapy and/or immunotherapy. Labs reviewed, WBC count, ANC, hemoglobin, platelet counts reviewed. Creatinine and liver function reviewed, okay to proceed with antineoplastic chemotherapy/immunotherapy today. Graded toxicities from treatment detailed above.           Signed By: Josefina Bustillo MD      Attending Medical Oncologist   [facility]

## 2023-04-27 NOTE — PROGRESS NOTES
Tiera Ramos is a 76 y.o. male who presents for follow up of   Chief Complaint   Patient presents with    Follow-up     rectal adenocarcinoma. The patient reports no new clinical symptoms or new complaints since last clinic evaluation. Pt reports doing well pt started his chemo medication today and is doing well pt starts radiation today as well. No interval hospitalizations reported    No interval surgery or procedures reported    No reported new medication changes reported       Medications reviewed with the patient, and chart updated to reflect changes. Review of Systems   Constitutional: Negative. HENT: Negative. Eyes: Negative. Respiratory: Negative. Cardiovascular: Negative. Gastrointestinal: Negative. Genitourinary: Negative. Musculoskeletal: Negative. Skin: Negative. Neurological: Negative. Endo/Heme/Allergies: Negative. Psychiatric/Behavioral: Negative.

## 2023-05-01 ENCOUNTER — TELEPHONE (OUTPATIENT)
Dept: ONCOLOGY | Age: 78
End: 2023-05-01

## 2023-05-09 LAB
ALBUMIN SERPL-MCNC: 4.2 G/DL (ref 3.7–4.7)
ALBUMIN/GLOB SERPL: 2.5 {RATIO} (ref 1.2–2.2)
ALP SERPL-CCNC: 50 IU/L (ref 44–121)
ALT SERPL-CCNC: 20 IU/L (ref 0–44)
AST SERPL-CCNC: 20 IU/L (ref 0–40)
BASOPHILS # BLD AUTO: 0 X10E3/UL (ref 0–0.2)
BASOPHILS NFR BLD AUTO: 1 %
BILIRUB SERPL-MCNC: 0.4 MG/DL (ref 0–1.2)
BUN SERPL-MCNC: 23 MG/DL (ref 8–27)
BUN/CREAT SERPL: 22 (ref 10–24)
CALCIUM SERPL-MCNC: 8.9 MG/DL (ref 8.6–10.2)
CHLORIDE SERPL-SCNC: 101 MMOL/L (ref 96–106)
CO2 SERPL-SCNC: 27 MMOL/L (ref 20–29)
CREAT SERPL-MCNC: 1.05 MG/DL (ref 0.76–1.27)
EGFRCR SERPLBLD CKD-EPI 2021: 72 ML/MIN/1.73
EOSINOPHIL # BLD AUTO: 0.2 X10E3/UL (ref 0–0.4)
EOSINOPHIL NFR BLD AUTO: 4 %
ERYTHROCYTE [DISTWIDTH] IN BLOOD BY AUTOMATED COUNT: 12.2 % (ref 11.6–15.4)
GLOBULIN SER CALC-MCNC: 1.7 G/DL (ref 1.5–4.5)
GLUCOSE SERPL-MCNC: 88 MG/DL (ref 70–99)
HCT VFR BLD AUTO: 38.1 % (ref 37.5–51)
HGB BLD-MCNC: 13 G/DL (ref 13–17.7)
IMM GRANULOCYTES # BLD AUTO: 0 X10E3/UL (ref 0–0.1)
IMM GRANULOCYTES NFR BLD AUTO: 0 %
LYMPHOCYTES # BLD AUTO: 1.3 X10E3/UL (ref 0.7–3.1)
LYMPHOCYTES NFR BLD AUTO: 24 %
MCH RBC QN AUTO: 30.5 PG (ref 26.6–33)
MCHC RBC AUTO-ENTMCNC: 34.1 G/DL (ref 31.5–35.7)
MCV RBC AUTO: 89 FL (ref 79–97)
MONOCYTES # BLD AUTO: 0.4 X10E3/UL (ref 0.1–0.9)
MONOCYTES NFR BLD AUTO: 7 %
NEUTROPHILS # BLD AUTO: 3.6 X10E3/UL (ref 1.4–7)
NEUTROPHILS NFR BLD AUTO: 64 %
PLATELET # BLD AUTO: 198 X10E3/UL (ref 150–450)
POTASSIUM SERPL-SCNC: 4.4 MMOL/L (ref 3.5–5.2)
PROT SERPL-MCNC: 5.9 G/DL (ref 6–8.5)
RBC # BLD AUTO: 4.26 X10E6/UL (ref 4.14–5.8)
SODIUM SERPL-SCNC: 139 MMOL/L (ref 134–144)
WBC # BLD AUTO: 5.5 X10E3/UL (ref 3.4–10.8)

## 2023-05-11 ENCOUNTER — OFFICE VISIT (OUTPATIENT)
Age: 78
End: 2023-05-11
Payer: MEDICARE

## 2023-05-11 VITALS
DIASTOLIC BLOOD PRESSURE: 77 MMHG | HEIGHT: 67 IN | BODY MASS INDEX: 29.98 KG/M2 | SYSTOLIC BLOOD PRESSURE: 139 MMHG | OXYGEN SATURATION: 95 % | HEART RATE: 64 BPM | RESPIRATION RATE: 17 BRPM | WEIGHT: 191 LBS | TEMPERATURE: 98.1 F

## 2023-05-11 DIAGNOSIS — C20 MALIGNANT NEOPLASM OF RECTUM (HCC): ICD-10-CM

## 2023-05-11 DIAGNOSIS — Z51.11 ENCOUNTER FOR ANTINEOPLASTIC CHEMOTHERAPY: Primary | ICD-10-CM

## 2023-05-11 PROCEDURE — 99215 OFFICE O/P EST HI 40 MIN: CPT | Performed by: STUDENT IN AN ORGANIZED HEALTH CARE EDUCATION/TRAINING PROGRAM

## 2023-05-11 RX ORDER — MELOXICAM 15 MG/1
TABLET ORAL
COMMUNITY
Start: 2022-12-29

## 2023-05-11 RX ORDER — OXYCODONE HYDROCHLORIDE 5 MG/1
TABLET ORAL
COMMUNITY
Start: 2023-02-28

## 2023-05-11 RX ORDER — PROCHLORPERAZINE MALEATE 10 MG
TABLET ORAL
COMMUNITY
Start: 2023-04-05

## 2023-05-11 RX ORDER — MULTIVITAMIN
1 TABLET ORAL DAILY
COMMUNITY

## 2023-05-11 RX ORDER — ONDANSETRON 4 MG/1
TABLET, ORALLY DISINTEGRATING ORAL
COMMUNITY
Start: 2023-04-05

## 2023-05-11 RX ORDER — CAPECITABINE 150 MG/1
300 TABLET, FILM COATED ORAL 2 TIMES DAILY
COMMUNITY
Start: 2023-04-05

## 2023-05-11 ASSESSMENT — ENCOUNTER SYMPTOMS
ALLERGIC/IMMUNOLOGIC NEGATIVE: 1
EYES NEGATIVE: 1
RESPIRATORY NEGATIVE: 1
GASTROINTESTINAL NEGATIVE: 1

## 2023-05-11 NOTE — PROGRESS NOTES
Cancer [place] at [address] [facility], [address]   W: [phone] F: [phone]         Reason for Visit:   Lala Acevedo is a 76 y.o. male who is seen in consultation at the request of Dr. Javier Sood for evaluation of rectal adenocarcinoma. Seen today in follow-up for chemotherapy. Hematology / Oncology Treatment History:       Hematological/Oncological Diagnosis: Rectal adenocarcinoma cStage 1, T2N0M0    Date of Diagnosis: 2/28/23     Treatment course:   5/1/23: Start of concurrent chemoRT with capecitabine      Pathology:     ==========================================================================   * * *FINAL PATHOLOGIC DIAGNOSIS* * *     Anal mass, transanal mucosal resection:        Invasive adenocarcinoma  with mucinous features, arising in a   tubulovillous adenoma. See synoptic report and comment.      COLON AND RECTUM: Resection, Including Transanal Disk Excision of      Rectal Neoplasms    SPECIMEN       Procedure:  Transanal disk excision (local excision)    TUMOR       Tumor Site: Rectum       Histologic Type: Adenocarcinoma with mucinous features       Histologic Grade: G2: Moderately differentiated        Tumor Size: 2.3 cm       Tumor Extension: Tumor invades muscularis propria       Macroscopic Tumor Perforation: Not identified       Lymphovascular Invasion:  Present       Perineural Invasion: Not identified       Treatment Effect: No known presurgical therapy    MARGINS       Margins: Transanal Disk Excision Specimen            Deep Margin: Involved by invasive carcinoma           Mucosal Margin: Involved by invasive carcinoma; distal tip and   right and left lateral margins                Status of Non-Invasive Tumor at Mucosal Margin:    LYMPH NODES       Regional Lymph Nodes: No lymph nodes submitted or found       Tumor Deposits:  Not identified    PATHOLOGIC STAGE CLASSIFICATION (pTNM, AJCC

## 2023-05-11 NOTE — PROGRESS NOTES
Yuniel Dave is a 76 y.o. male who presents for follow up of   Chief Complaint   Patient presents with    Follow-up     Malignant neoplasm of the rectum       The patient reports no new clinical symptoms or new complaints since last clinic evaluation. No interval hospitalizations reported    No interval surgery or procedures reported    No reported new medication changes reported       Medications reviewed with the patient, and chart updated to reflect changes. Review of Systems   Constitutional: Negative. HENT: Negative. Eyes: Negative. Respiratory: Negative. Cardiovascular: Negative. Gastrointestinal: Negative. Endocrine: Negative. Genitourinary: Negative. Musculoskeletal: Negative. Skin: Negative. Allergic/Immunologic: Negative. Neurological: Negative. Hematological: Negative. Psychiatric/Behavioral: Negative.

## 2023-05-12 ENCOUNTER — TELEPHONE (OUTPATIENT)
Age: 78
End: 2023-05-12

## 2023-05-13 RX ORDER — PROCHLORPERAZINE MALEATE 10 MG
5 TABLET ORAL EVERY 6 HOURS PRN
COMMUNITY
Start: 2023-04-10

## 2023-05-13 RX ORDER — ONDANSETRON 4 MG/1
4 TABLET, ORALLY DISINTEGRATING ORAL EVERY 6 HOURS PRN
COMMUNITY
Start: 2023-04-10 | End: 2023-05-22 | Stop reason: ALTCHOICE

## 2023-05-13 RX ORDER — CAPECITABINE 500 MG/1
1500 TABLET, FILM COATED ORAL 2 TIMES DAILY
COMMUNITY
Start: 2023-04-05

## 2023-05-15 DIAGNOSIS — K12.30 MUCOSITIS: Primary | ICD-10-CM

## 2023-05-19 ENCOUNTER — CLINICAL DOCUMENTATION (OUTPATIENT)
Age: 78
End: 2023-05-19

## 2023-05-19 RX ORDER — TRIAMCINOLONE ACETONIDE 1 MG/G
CREAM TOPICAL
Qty: 15 G | Refills: 0 | Status: SHIPPED | OUTPATIENT
Start: 2023-05-19

## 2023-05-19 NOTE — PROGRESS NOTES
Went to see Mr. Elisha Singh today while he was in radiation. Had red splotchy looking spots on his arm from his elbow to his hand on both sides. Also had a few on his neck and ear. Spoke with Dr. Sonya Nathan and will send a low dose steroid for the few red areas with crusty cover. Spoke with Mr. Elisha Singh and relayed information. Thankful for call.      For Pharmacy Admin Tracking Only    Program: Medical Group  Time Spent (min): 10

## 2023-05-21 ENCOUNTER — HOSPITAL ENCOUNTER (INPATIENT)
Facility: HOSPITAL | Age: 78
LOS: 3 days | Discharge: HOME | DRG: 392 | End: 2023-05-24
Attending: EMERGENCY MEDICINE | Admitting: STUDENT IN AN ORGANIZED HEALTH CARE EDUCATION/TRAINING PROGRAM
Payer: MEDICARE

## 2023-05-21 ENCOUNTER — TELEPHONE (OUTPATIENT)
Age: 78
End: 2023-05-21

## 2023-05-21 DIAGNOSIS — K52.9 COLITIS PRESUMED INFECTIOUS: Primary | ICD-10-CM

## 2023-05-21 DIAGNOSIS — C20 MALIGNANT NEOPLASM OF RECTUM (HCC): Primary | ICD-10-CM

## 2023-05-21 LAB
ALBUMIN SERPL-MCNC: 3 G/DL (ref 3.5–5)
ALBUMIN/GLOB SERPL: 1 (ref 1.1–2.2)
ALP SERPL-CCNC: 42 U/L (ref 45–117)
ALT SERPL-CCNC: 30 U/L (ref 12–78)
ANION GAP SERPL CALC-SCNC: 3 MMOL/L (ref 5–15)
AST SERPL-CCNC: 21 U/L (ref 15–37)
BASOPHILS # BLD: 0.1 K/UL (ref 0–0.1)
BASOPHILS NFR BLD: 1 % (ref 0–1)
BILIRUB SERPL-MCNC: 0.7 MG/DL (ref 0.2–1)
BUN SERPL-MCNC: 11 MG/DL (ref 6–20)
BUN/CREAT SERPL: 12 (ref 12–20)
CALCIUM SERPL-MCNC: 8.1 MG/DL (ref 8.5–10.1)
CHLORIDE SERPL-SCNC: 102 MMOL/L (ref 97–108)
CO2 SERPL-SCNC: 30 MMOL/L (ref 21–32)
CREAT SERPL-MCNC: 0.91 MG/DL (ref 0.7–1.3)
DIFFERENTIAL METHOD BLD: ABNORMAL
EOSINOPHIL # BLD: 0.3 K/UL (ref 0–0.4)
EOSINOPHIL NFR BLD: 4 % (ref 0–7)
ERYTHROCYTE [DISTWIDTH] IN BLOOD BY AUTOMATED COUNT: 14.5 % (ref 11.5–14.5)
GLOBULIN SER CALC-MCNC: 2.9 G/DL (ref 2–4)
GLUCOSE SERPL-MCNC: 121 MG/DL (ref 65–100)
HCT VFR BLD AUTO: 37.7 % (ref 36.6–50.3)
HGB BLD-MCNC: 12.5 G/DL (ref 12.1–17)
IMM GRANULOCYTES # BLD AUTO: 0 K/UL (ref 0–0.04)
IMM GRANULOCYTES NFR BLD AUTO: 0 % (ref 0–0.5)
LACTATE SERPL-SCNC: 1 MMOL/L (ref 0.4–2)
LYMPHOCYTES # BLD: 0.5 K/UL (ref 0.8–3.5)
LYMPHOCYTES NFR BLD: 8 % (ref 12–49)
MCH RBC QN AUTO: 30.1 PG (ref 26–34)
MCHC RBC AUTO-ENTMCNC: 33.2 G/DL (ref 30–36.5)
MCV RBC AUTO: 90.8 FL (ref 80–99)
MONOCYTES # BLD: 0.8 K/UL (ref 0–1)
MONOCYTES NFR BLD: 13 % (ref 5–13)
NEUTS SEG # BLD: 4.8 K/UL (ref 1.8–8)
NEUTS SEG NFR BLD: 74 % (ref 32–75)
NRBC # BLD: 0 K/UL (ref 0–0.01)
NRBC BLD-RTO: 0 PER 100 WBC
PLATELET # BLD AUTO: 155 K/UL (ref 150–400)
PMV BLD AUTO: 9.7 FL (ref 8.9–12.9)
POTASSIUM SERPL-SCNC: 3.6 MMOL/L (ref 3.5–5.1)
PROT SERPL-MCNC: 5.9 G/DL (ref 6.4–8.2)
RBC # BLD AUTO: 4.15 M/UL (ref 4.1–5.7)
RBC MORPH BLD: ABNORMAL
SODIUM SERPL-SCNC: 135 MMOL/L (ref 136–145)
WBC # BLD AUTO: 6.5 K/UL (ref 4.1–11.1)

## 2023-05-21 PROCEDURE — 99285 EMERGENCY DEPT VISIT HI MDM: CPT

## 2023-05-21 PROCEDURE — 83605 ASSAY OF LACTIC ACID: CPT

## 2023-05-21 PROCEDURE — 80053 COMPREHEN METABOLIC PANEL: CPT

## 2023-05-21 PROCEDURE — 36415 COLL VENOUS BLD VENIPUNCTURE: CPT

## 2023-05-21 PROCEDURE — 85025 COMPLETE CBC W/AUTO DIFF WBC: CPT

## 2023-05-21 PROCEDURE — 1100000000 HC RM PRIVATE

## 2023-05-21 ASSESSMENT — LIFESTYLE VARIABLES
HOW OFTEN DO YOU HAVE A DRINK CONTAINING ALCOHOL: NEVER
HOW MANY STANDARD DRINKS CONTAINING ALCOHOL DO YOU HAVE ON A TYPICAL DAY: PATIENT DOES NOT DRINK

## 2023-05-21 ASSESSMENT — PAIN SCALES - GENERAL: PAINLEVEL_OUTOF10: 0

## 2023-05-22 ENCOUNTER — APPOINTMENT (OUTPATIENT)
Facility: HOSPITAL | Age: 78
DRG: 392 | End: 2023-05-22
Payer: MEDICARE

## 2023-05-22 ENCOUNTER — TELEPHONE (OUTPATIENT)
Age: 78
End: 2023-05-22

## 2023-05-22 LAB
ALBUMIN SERPL-MCNC: 2.8 G/DL (ref 3.5–5)
ALBUMIN/GLOB SERPL: 1 (ref 1.1–2.2)
ALP SERPL-CCNC: 42 U/L (ref 45–117)
ALT SERPL-CCNC: 29 U/L (ref 12–78)
ANION GAP SERPL CALC-SCNC: 4 MMOL/L (ref 5–15)
AST SERPL-CCNC: 26 U/L (ref 15–37)
BASOPHILS # BLD: 0 K/UL (ref 0–0.1)
BASOPHILS NFR BLD: 1 % (ref 0–1)
BILIRUB SERPL-MCNC: 0.5 MG/DL (ref 0.2–1)
BUN SERPL-MCNC: 13 MG/DL (ref 6–20)
BUN/CREAT SERPL: 14 (ref 12–20)
CALCIUM SERPL-MCNC: 7.9 MG/DL (ref 8.5–10.1)
CHLORIDE SERPL-SCNC: 105 MMOL/L (ref 97–108)
CO2 SERPL-SCNC: 27 MMOL/L (ref 21–32)
CREAT SERPL-MCNC: 0.91 MG/DL (ref 0.7–1.3)
DIFFERENTIAL METHOD BLD: ABNORMAL
EOSINOPHIL # BLD: 0.2 K/UL (ref 0–0.4)
EOSINOPHIL NFR BLD: 3 % (ref 0–7)
ERYTHROCYTE [DISTWIDTH] IN BLOOD BY AUTOMATED COUNT: 14.2 % (ref 11.5–14.5)
GLOBULIN SER CALC-MCNC: 2.9 G/DL (ref 2–4)
GLUCOSE SERPL-MCNC: 121 MG/DL (ref 65–100)
HCT VFR BLD AUTO: 36.5 % (ref 36.6–50.3)
HGB BLD-MCNC: 12.4 G/DL (ref 12.1–17)
IMM GRANULOCYTES # BLD AUTO: 0 K/UL (ref 0–0.04)
IMM GRANULOCYTES NFR BLD AUTO: 1 % (ref 0–0.5)
INR PPP: 1.1 (ref 0.9–1.1)
LYMPHOCYTES # BLD: 0.6 K/UL (ref 0.8–3.5)
LYMPHOCYTES NFR BLD: 9 % (ref 12–49)
MAGNESIUM SERPL-MCNC: 2.1 MG/DL (ref 1.6–2.4)
MCH RBC QN AUTO: 30.8 PG (ref 26–34)
MCHC RBC AUTO-ENTMCNC: 34 G/DL (ref 30–36.5)
MCV RBC AUTO: 90.6 FL (ref 80–99)
MONOCYTES # BLD: 0.9 K/UL (ref 0–1)
MONOCYTES NFR BLD: 14 % (ref 5–13)
NEUTS SEG # BLD: 4.5 K/UL (ref 1.8–8)
NEUTS SEG NFR BLD: 73 % (ref 32–75)
NRBC # BLD: 0 K/UL (ref 0–0.01)
NRBC BLD-RTO: 0 PER 100 WBC
PLATELET # BLD AUTO: 144 K/UL (ref 150–400)
PMV BLD AUTO: 9.8 FL (ref 8.9–12.9)
POTASSIUM SERPL-SCNC: 3.7 MMOL/L (ref 3.5–5.1)
PROCALCITONIN SERPL-MCNC: 0.15 NG/ML
PROT SERPL-MCNC: 5.7 G/DL (ref 6.4–8.2)
PROTHROMBIN TIME: 11.9 SEC (ref 9–11.1)
RBC # BLD AUTO: 4.03 M/UL (ref 4.1–5.7)
SODIUM SERPL-SCNC: 136 MMOL/L (ref 136–145)
WBC # BLD AUTO: 6.3 K/UL (ref 4.1–11.1)

## 2023-05-22 PROCEDURE — 2580000003 HC RX 258: Performed by: INTERNAL MEDICINE

## 2023-05-22 PROCEDURE — 6360000002 HC RX W HCPCS: Performed by: STUDENT IN AN ORGANIZED HEALTH CARE EDUCATION/TRAINING PROGRAM

## 2023-05-22 PROCEDURE — 2500000003 HC RX 250 WO HCPCS

## 2023-05-22 PROCEDURE — 85025 COMPLETE CBC W/AUTO DIFF WBC: CPT

## 2023-05-22 PROCEDURE — A4216 STERILE WATER/SALINE, 10 ML: HCPCS

## 2023-05-22 PROCEDURE — 2500000003 HC RX 250 WO HCPCS: Performed by: INTERNAL MEDICINE

## 2023-05-22 PROCEDURE — 6370000000 HC RX 637 (ALT 250 FOR IP): Performed by: INTERNAL MEDICINE

## 2023-05-22 PROCEDURE — 1170000000 HC RM PRIVATE ONCOLOGY

## 2023-05-22 PROCEDURE — 85610 PROTHROMBIN TIME: CPT

## 2023-05-22 PROCEDURE — 86671 FUNGUS NES ANTIBODY: CPT

## 2023-05-22 PROCEDURE — 83735 ASSAY OF MAGNESIUM: CPT

## 2023-05-22 PROCEDURE — 36415 COLL VENOUS BLD VENIPUNCTURE: CPT

## 2023-05-22 PROCEDURE — 80053 COMPREHEN METABOLIC PANEL: CPT

## 2023-05-22 PROCEDURE — 99222 1ST HOSP IP/OBS MODERATE 55: CPT | Performed by: STUDENT IN AN ORGANIZED HEALTH CARE EDUCATION/TRAINING PROGRAM

## 2023-05-22 PROCEDURE — 86037 ANCA TITER EACH ANTIBODY: CPT

## 2023-05-22 PROCEDURE — 84145 PROCALCITONIN (PCT): CPT

## 2023-05-22 PROCEDURE — 6370000000 HC RX 637 (ALT 250 FOR IP)

## 2023-05-22 PROCEDURE — 2580000003 HC RX 258: Performed by: STUDENT IN AN ORGANIZED HEALTH CARE EDUCATION/TRAINING PROGRAM

## 2023-05-22 PROCEDURE — 6360000002 HC RX W HCPCS

## 2023-05-22 PROCEDURE — C9113 INJ PANTOPRAZOLE SODIUM, VIA: HCPCS

## 2023-05-22 PROCEDURE — 6360000002 HC RX W HCPCS: Performed by: INTERNAL MEDICINE

## 2023-05-22 PROCEDURE — 74176 CT ABD & PELVIS W/O CONTRAST: CPT

## 2023-05-22 PROCEDURE — 2580000003 HC RX 258

## 2023-05-22 PROCEDURE — 71045 X-RAY EXAM CHEST 1 VIEW: CPT

## 2023-05-22 RX ORDER — DULOXETIN HYDROCHLORIDE 30 MG/1
60 CAPSULE, DELAYED RELEASE ORAL EVERY EVENING
COMMUNITY

## 2023-05-22 RX ORDER — SODIUM CHLORIDE 9 MG/ML
INJECTION, SOLUTION INTRAVENOUS ONCE
Status: COMPLETED | OUTPATIENT
Start: 2023-05-22 | End: 2023-05-22

## 2023-05-22 RX ORDER — ONDANSETRON 2 MG/ML
4 INJECTION INTRAMUSCULAR; INTRAVENOUS EVERY 6 HOURS PRN
Status: DISCONTINUED | OUTPATIENT
Start: 2023-05-22 | End: 2023-05-24 | Stop reason: HOSPADM

## 2023-05-22 RX ORDER — SODIUM CHLORIDE 9 MG/ML
INJECTION, SOLUTION INTRAVENOUS PRN
Status: DISCONTINUED | OUTPATIENT
Start: 2023-05-22 | End: 2023-05-24 | Stop reason: HOSPADM

## 2023-05-22 RX ORDER — FAMOTIDINE 20 MG/1
40 TABLET, FILM COATED ORAL DAILY
Status: DISCONTINUED | OUTPATIENT
Start: 2023-05-22 | End: 2023-05-24 | Stop reason: HOSPADM

## 2023-05-22 RX ORDER — ONDANSETRON 4 MG/1
4 TABLET, ORALLY DISINTEGRATING ORAL EVERY 8 HOURS PRN
Status: DISCONTINUED | OUTPATIENT
Start: 2023-05-22 | End: 2023-05-24 | Stop reason: HOSPADM

## 2023-05-22 RX ORDER — ACETAMINOPHEN 325 MG/1
650 TABLET ORAL EVERY 6 HOURS PRN
Status: DISCONTINUED | OUTPATIENT
Start: 2023-05-22 | End: 2023-05-24 | Stop reason: HOSPADM

## 2023-05-22 RX ORDER — ASPIRIN 81 MG/1
81 TABLET ORAL DAILY
Status: DISCONTINUED | OUTPATIENT
Start: 2023-05-22 | End: 2023-05-24 | Stop reason: HOSPADM

## 2023-05-22 RX ORDER — FAMOTIDINE 40 MG/1
40 TABLET, FILM COATED ORAL DAILY
COMMUNITY

## 2023-05-22 RX ORDER — METRONIDAZOLE 500 MG/100ML
500 INJECTION, SOLUTION INTRAVENOUS EVERY 12 HOURS
Status: DISCONTINUED | OUTPATIENT
Start: 2023-05-22 | End: 2023-05-22

## 2023-05-22 RX ORDER — SODIUM CHLORIDE 0.9 % (FLUSH) 0.9 %
5-40 SYRINGE (ML) INJECTION EVERY 12 HOURS SCHEDULED
Status: DISCONTINUED | OUTPATIENT
Start: 2023-05-22 | End: 2023-05-24 | Stop reason: HOSPADM

## 2023-05-22 RX ORDER — ACETAMINOPHEN 650 MG/1
650 SUPPOSITORY RECTAL EVERY 6 HOURS PRN
Status: DISCONTINUED | OUTPATIENT
Start: 2023-05-22 | End: 2023-05-24 | Stop reason: HOSPADM

## 2023-05-22 RX ORDER — ROSUVASTATIN CALCIUM 20 MG/1
40 TABLET, COATED ORAL NIGHTLY
Status: DISCONTINUED | OUTPATIENT
Start: 2023-05-22 | End: 2023-05-24 | Stop reason: HOSPADM

## 2023-05-22 RX ORDER — SODIUM CHLORIDE 0.9 % (FLUSH) 0.9 %
5-40 SYRINGE (ML) INJECTION PRN
Status: DISCONTINUED | OUTPATIENT
Start: 2023-05-22 | End: 2023-05-24 | Stop reason: HOSPADM

## 2023-05-22 RX ORDER — DULOXETIN HYDROCHLORIDE 30 MG/1
60 CAPSULE, DELAYED RELEASE ORAL EVERY EVENING
Status: DISCONTINUED | OUTPATIENT
Start: 2023-05-22 | End: 2023-05-24 | Stop reason: HOSPADM

## 2023-05-22 RX ORDER — METRONIDAZOLE 500 MG/100ML
500 INJECTION, SOLUTION INTRAVENOUS EVERY 12 HOURS
Status: DISCONTINUED | OUTPATIENT
Start: 2023-05-22 | End: 2023-05-24 | Stop reason: HOSPADM

## 2023-05-22 RX ORDER — ALPRAZOLAM 0.5 MG/1
0.5 TABLET ORAL 2 TIMES DAILY PRN
Status: DISCONTINUED | OUTPATIENT
Start: 2023-05-22 | End: 2023-05-24 | Stop reason: HOSPADM

## 2023-05-22 RX ORDER — SODIUM CHLORIDE 9 MG/ML
INJECTION, SOLUTION INTRAVENOUS CONTINUOUS
Status: DISCONTINUED | OUTPATIENT
Start: 2023-05-22 | End: 2023-05-22

## 2023-05-22 RX ORDER — CERAMIDES 1,3,6-II
CREAM (GRAM) TOPICAL PRN
COMMUNITY

## 2023-05-22 RX ORDER — POLYETHYLENE GLYCOL 3350 17 G/17G
17 POWDER, FOR SOLUTION ORAL DAILY PRN
Status: DISCONTINUED | OUTPATIENT
Start: 2023-05-22 | End: 2023-05-22

## 2023-05-22 RX ORDER — CALCIUM GLUCONATE 20 MG/ML
1000 INJECTION, SOLUTION INTRAVENOUS ONCE
Status: COMPLETED | OUTPATIENT
Start: 2023-05-22 | End: 2023-05-22

## 2023-05-22 RX ADMIN — ROSUVASTATIN 40 MG: 20 TABLET, FILM COATED ORAL at 20:24

## 2023-05-22 RX ADMIN — CALCIUM GLUCONATE 1000 MG: 20 INJECTION, SOLUTION INTRAVENOUS at 04:22

## 2023-05-22 RX ADMIN — DULOXETINE HYDROCHLORIDE 60 MG: 30 CAPSULE, DELAYED RELEASE ORAL at 18:25

## 2023-05-22 RX ADMIN — SODIUM CHLORIDE: 9 INJECTION, SOLUTION INTRAVENOUS at 04:17

## 2023-05-22 RX ADMIN — SODIUM CHLORIDE 1000 MG: 900 INJECTION INTRAVENOUS at 15:22

## 2023-05-22 RX ADMIN — PIPERACILLIN AND TAZOBACTAM 4500 MG: 4; .5 INJECTION, POWDER, LYOPHILIZED, FOR SOLUTION INTRAVENOUS at 12:43

## 2023-05-22 RX ADMIN — SODIUM CHLORIDE, PRESERVATIVE FREE 10 ML: 5 INJECTION INTRAVENOUS at 20:23

## 2023-05-22 RX ADMIN — FAMOTIDINE 40 MG: 20 TABLET ORAL at 15:23

## 2023-05-22 RX ADMIN — ASPIRIN 81 MG: 81 TABLET, COATED ORAL at 15:23

## 2023-05-22 RX ADMIN — METRONIDAZOLE 500 MG: 500 INJECTION, SOLUTION INTRAVENOUS at 16:35

## 2023-05-22 RX ADMIN — SODIUM CHLORIDE 40 MG: 9 INJECTION INTRAMUSCULAR; INTRAVENOUS; SUBCUTANEOUS at 08:47

## 2023-05-22 RX ADMIN — ACETAMINOPHEN 650 MG: 325 TABLET ORAL at 22:40

## 2023-05-22 NOTE — TELEPHONE ENCOUNTER
Consult:     Name: Bettie Morfin    : 41    Provider: Connor Nguyen    Reason: [address] Sigmoid Colitis     Room: ER 25    Phone: [phone]    Nurse: Bryn Ulloa

## 2023-05-22 NOTE — H&P
Hospitalist Admission Note    NAME:   Ruthann Horn   : 1945   MRN: [id number]     Date/Time: 2023 11:12 PM    Patient PCP: Karey Garcia MD    ______________________________________________________________________  Given the patient's current clinical presentation, I have a high level of concern for decompensation if discharged from the emergency department. Complex decision making was performed, which includes reviewing the patient's available past medical records, laboratory results, and x-ray films. My assessment of this patient's clinical condition and my plan of care is as follows. Assessment / Plan:    Colitis presumed infection  Hyponatremia  Hypocalcemia  Hypoalbuminemia  - Pending abdominal CT  - Chest xray: no acute process  - WBC 6.3  - Started IV Flagyl and Vancomycin BID x7 days   - Calcium repletion x one dose  - NS @ 75mL/hr x once  - Pharmacy to review medications for reconciliation   - Oncology consulted: pt of         Medical Decision Making:   I personally reviewed labs: pending labs  I personally reviewed imaging: pending labs  Toxic drug monitoring: none  Discussed case with: ED provider. After discussion I am in agreement that acuity of patient's medical condition necessitates hospital stay. Code Status: Full Code  DVT Prophylaxis: SCD's  GI Prophylaxis: Protonix  Baseline: A&Ox3    Subjective:   CHIEF COMPLAINT: Fever and diarrhea    HISTORY OF PRESENT ILLNESS:     Ruthann Horn is a 76 y.o.  male with PMHx significant for HLD, arthritis, and factor V leiden presents with likely infectious rectosigmoid colitis. He presents as transfer from [address] [place] [address] with lab work and imaging demonstrating rectosigmoid colitis. He was given 3 L of fluid, vancomycin and cefepime. Pt started chemo and radiation x 3 weeks ago and on the 14th day he developed loose stools and started to [employer] poorly\". Since then he has stopped taking Miralax and Metamucil.  He

## 2023-05-22 NOTE — CONSULTS
Cecilia Fried, NP-C  [phone] cell     Gastroenterology Consultation Note      Admit Date: 5/21/2023  Consult Date: 5/22/2023   I greatly appreciate your asking me to see Desire Wilkes, thank you very much for the opportunity to participate in his care. Narrative Assessment and Plan   GI consultation for diarrhea and colitis on CT. 75 y/o male diagnosed with rectal adenocarcinoma earlier this year. Had resection but had positive margins and LN, chemo and radiation started 5/1/2023. Diarrhea started 4-5 days ago, fever 101.4. CT shows RS colitis. No antibiotic use or sick contacts. Small amount BRB in stool. Impression:  Rectal adenocarcinoma, currently having chemo and radiation  Diarrhea  RS colitis on CT  Rectal bleeding    Follow for results of stools studies, continue antibiotics until infection ruled out. Other consideration is side effect of chemotherapy and/or radiation. Subjective:     Chief Complaint: Diarrhea, colitis, rectal adenocarcinoma    History of Present Illness:  GI consultation for CT findings of colitis. 75 y/o male with history of rectal adenocarcinoma who started chemo and radiation 5/1/2023 with capecitabine. He saw Dr. Sonja Mancini 9/2022 with complaint of anal leakage and incontinence, underwent colonoscopy 1/2023 and had a single polyp below dentate line. Referred to CRS and polyp was resected via transanal disc excision, he had positive margins and positive LN so chemo and radiation was started. He developed diarrhea about 4-5 days ago and also had fever to 101.4. No antibiotic use and no sick contacts. Has had some BRB in stool since starting chemo. No abdominal pain but had some nausea with fever. Takes 2 Aleve twice a day for arthritis. Has had more reflux and heartburn since starting chemo, unable to take PPI, takes famotidine instead but not that helpful.      Also had anal manometry 10/2022 which showed dyssynergic

## 2023-05-22 NOTE — CONSULTS
Cancer [place] at [address]   W: [phone] F: [phone]             Reason for Visit:     Janeth Krishnamurthy is a 76 y.o. male who is seen in consultation at the request of Dr. Colt Munoz for evaluation of rectal adenocarcinoma. Now consulted for clinical deterioration and colitis          Hematology / Oncology Treatment History:        Hematological/Oncological Diagnosis: Rectal adenocarcinoma cStage 1, T2N0M0   Date of Diagnosis:       Treatment course: concurrent chemoRT with capecitabine           Pathology:     ==========================================================================   * * *FINAL PATHOLOGIC DIAGNOSIS* * *     Anal mass, transanal mucosal resection:        Invasive adenocarcinoma  with mucinous features, arising in a   tubulovillous adenoma. See synoptic report and comment.      COLON AND RECTUM: Resection, Including Transanal Disk Excision of      Rectal Neoplasms    SPECIMEN       Procedure:  Transanal disk excision (local excision)    TUMOR       Tumor Site: Rectum       Histologic Type: Adenocarcinoma with mucinous features       Histologic Grade: G2: Moderately differentiated        Tumor Size: 2.3 cm       Tumor Extension: Tumor invades muscularis propria       Macroscopic Tumor Perforation: Not identified       Lymphovascular Invasion:  Present       Perineural Invasion: Not identified       Treatment Effect: No known presurgical therapy    MARGINS       Margins: Transanal Disk Excision Specimen            Deep Margin: Involved by invasive carcinoma           Mucosal Margin: Involved by invasive carcinoma; distal tip and   right and left lateral margins                Status of Non-Invasive Tumor at Mucosal Margin:    LYMPH NODES       Regional Lymph Nodes: No lymph nodes submitted or found       Tumor Deposits:  Not identified    PATHOLOGIC STAGE CLASSIFICATION (pTNM,

## 2023-05-23 LAB
ALBUMIN SERPL-MCNC: 2.7 G/DL (ref 3.5–5)
ALBUMIN/GLOB SERPL: 0.8 (ref 1.1–2.2)
ALP SERPL-CCNC: 42 U/L (ref 45–117)
ALT SERPL-CCNC: 33 U/L (ref 12–78)
ANION GAP SERPL CALC-SCNC: 5 MMOL/L (ref 5–15)
AST SERPL-CCNC: 28 U/L (ref 15–37)
BAKER'S YEAST IGA QN: <20 UNITS (ref 0–24.9)
BAKER'S YEAST IGG QN: <20 UNITS (ref 0–24.9)
BASOPHILS # BLD: 0 K/UL (ref 0–0.1)
BASOPHILS NFR BLD: 0 % (ref 0–1)
BILIRUB SERPL-MCNC: 0.5 MG/DL (ref 0.2–1)
BUN SERPL-MCNC: 19 MG/DL (ref 6–20)
BUN/CREAT SERPL: 19 (ref 12–20)
C COLI+JEJUNI TUF STL QL NAA+PROBE: NEGATIVE
C DIFF GDH STL QL: NEGATIVE
C DIFF TOX A+B STL QL IA: NEGATIVE
C DIFF TOXIN INTERPRETATION: NORMAL
CALCIUM SERPL-MCNC: 7.7 MG/DL (ref 8.5–10.1)
CHLORIDE SERPL-SCNC: 108 MMOL/L (ref 97–108)
CO2 SERPL-SCNC: 25 MMOL/L (ref 21–32)
CREAT SERPL-MCNC: 1.01 MG/DL (ref 0.7–1.3)
DIFFERENTIAL METHOD BLD: ABNORMAL
EC STX1+STX2 GENES STL QL NAA+PROBE: NEGATIVE
EOSINOPHIL # BLD: 0.2 K/UL (ref 0–0.4)
EOSINOPHIL NFR BLD: 3 % (ref 0–7)
ERYTHROCYTE [DISTWIDTH] IN BLOOD BY AUTOMATED COUNT: 14.4 % (ref 11.5–14.5)
ETEC ELTA+ESTB GENES STL QL NAA+PROBE: NEGATIVE
GLOBULIN SER CALC-MCNC: 3.3 G/DL (ref 2–4)
GLUCOSE SERPL-MCNC: 126 MG/DL (ref 65–100)
HCT VFR BLD AUTO: 36 % (ref 36.6–50.3)
HEMOCCULT STL QL: POSITIVE
HGB BLD-MCNC: 12.3 G/DL (ref 12.1–17)
IMM GRANULOCYTES # BLD AUTO: 0 K/UL (ref 0–0.04)
IMM GRANULOCYTES NFR BLD AUTO: 0 % (ref 0–0.5)
LYMPHOCYTES # BLD: 0.5 K/UL (ref 0.8–3.5)
LYMPHOCYTES NFR BLD: 8 % (ref 12–49)
MAGNESIUM SERPL-MCNC: 1.8 MG/DL (ref 1.6–2.4)
MCH RBC QN AUTO: 31.1 PG (ref 26–34)
MCHC RBC AUTO-ENTMCNC: 34.2 G/DL (ref 30–36.5)
MCV RBC AUTO: 91.1 FL (ref 80–99)
MONOCYTES # BLD: 0.8 K/UL (ref 0–1)
MONOCYTES NFR BLD: 12 % (ref 5–13)
NEUTS BAND NFR BLD MANUAL: 2 %
NEUTS SEG # BLD: 5.3 K/UL (ref 1.8–8)
NEUTS SEG NFR BLD: 75 % (ref 32–75)
NRBC # BLD: 0 K/UL (ref 0–0.01)
NRBC BLD-RTO: 0 PER 100 WBC
P SHIGELLOIDES DNA STL QL NAA+PROBE: NEGATIVE
P-ANCA ATYPICAL TITR SER IF: NORMAL TITER
PLATELET # BLD AUTO: 171 K/UL (ref 150–400)
PMV BLD AUTO: 9.9 FL (ref 8.9–12.9)
POTASSIUM SERPL-SCNC: 3.5 MMOL/L (ref 3.5–5.1)
PROT SERPL-MCNC: 6 G/DL (ref 6.4–8.2)
RBC # BLD AUTO: 3.95 M/UL (ref 4.1–5.7)
RBC MORPH BLD: ABNORMAL
SALMONELLA SP SPAO STL QL NAA+PROBE: NEGATIVE
SHIGELLA SP+EIEC IPAH STL QL NAA+PROBE: NEGATIVE
SODIUM SERPL-SCNC: 138 MMOL/L (ref 136–145)
V CHOL+PARA+VUL DNA STL QL NAA+NON-PROBE: NEGATIVE
WBC # BLD AUTO: 6.8 K/UL (ref 4.1–11.1)
WBC #/AREA STL HPF: >20 /HPF (ref 0–4)
Y ENTEROCOL DNA STL QL NAA+NON-PROBE: NEGATIVE

## 2023-05-23 PROCEDURE — 6360000002 HC RX W HCPCS: Performed by: INTERNAL MEDICINE

## 2023-05-23 PROCEDURE — 85025 COMPLETE CBC W/AUTO DIFF WBC: CPT

## 2023-05-23 PROCEDURE — 83735 ASSAY OF MAGNESIUM: CPT

## 2023-05-23 PROCEDURE — 2500000003 HC RX 250 WO HCPCS: Performed by: INTERNAL MEDICINE

## 2023-05-23 PROCEDURE — 6370000000 HC RX 637 (ALT 250 FOR IP): Performed by: INTERNAL MEDICINE

## 2023-05-23 PROCEDURE — 87506 IADNA-DNA/RNA PROBE TQ 6-11: CPT

## 2023-05-23 PROCEDURE — 89055 LEUKOCYTE ASSESSMENT FECAL: CPT

## 2023-05-23 PROCEDURE — 87324 CLOSTRIDIUM AG IA: CPT

## 2023-05-23 PROCEDURE — 80053 COMPREHEN METABOLIC PANEL: CPT

## 2023-05-23 PROCEDURE — 2580000003 HC RX 258: Performed by: INTERNAL MEDICINE

## 2023-05-23 PROCEDURE — 1170000000 HC RM PRIVATE ONCOLOGY

## 2023-05-23 PROCEDURE — 87449 NOS EACH ORGANISM AG IA: CPT

## 2023-05-23 PROCEDURE — 36415 COLL VENOUS BLD VENIPUNCTURE: CPT

## 2023-05-23 PROCEDURE — 2580000003 HC RX 258

## 2023-05-23 PROCEDURE — 82272 OCCULT BLD FECES 1-3 TESTS: CPT

## 2023-05-23 RX ADMIN — METRONIDAZOLE 500 MG: 500 INJECTION, SOLUTION INTRAVENOUS at 04:12

## 2023-05-23 RX ADMIN — FAMOTIDINE 40 MG: 20 TABLET ORAL at 08:42

## 2023-05-23 RX ADMIN — ROSUVASTATIN 40 MG: 20 TABLET, FILM COATED ORAL at 22:47

## 2023-05-23 RX ADMIN — SODIUM CHLORIDE, PRESERVATIVE FREE 10 ML: 5 INJECTION INTRAVENOUS at 22:48

## 2023-05-23 RX ADMIN — METRONIDAZOLE 500 MG: 500 INJECTION, SOLUTION INTRAVENOUS at 13:56

## 2023-05-23 RX ADMIN — ASPIRIN 81 MG: 81 TABLET, COATED ORAL at 08:42

## 2023-05-23 RX ADMIN — DULOXETINE HYDROCHLORIDE 60 MG: 30 CAPSULE, DELAYED RELEASE ORAL at 17:58

## 2023-05-23 RX ADMIN — SODIUM CHLORIDE 1000 MG: 900 INJECTION INTRAVENOUS at 13:01

## 2023-05-23 RX ADMIN — SODIUM CHLORIDE, PRESERVATIVE FREE 10 ML: 5 INJECTION INTRAVENOUS at 08:45

## 2023-05-23 ASSESSMENT — ENCOUNTER SYMPTOMS
BLOOD IN STOOL: 1
NAUSEA: 1
RESPIRATORY NEGATIVE: 1
ABDOMINAL PAIN: 0
DIARRHEA: 0

## 2023-05-23 ASSESSMENT — PAIN SCALES - GENERAL: PAINLEVEL_OUTOF10: 0

## 2023-05-23 NOTE — CARE COORDINATION
Care Management Initial Assessment       RUR: 12% moderate  Readmission? No  1st IM letter given? Yes - 05/21  1st  letter given: No     05/23/23 0928   Service Assessment   Patient Orientation Alert and Oriented   Cognition Alert   History Provided By Patient   Primary Caregiver Self   Support Systems Spouse/Significant Other   Patient's Healthcare Decision Maker is: Patient Declined (Legal Next of Kin Remains as Decision Maker)   PCP Verified by CM Yes   Last Visit to PCP Within last 3 months   Prior Functional Level Independent in ADLs/IADLs   Current Functional Level Independent in ADLs/IADLs   Can patient return to prior living arrangement Yes   Would you like for me to discuss the discharge plan with any other family members/significant others, and if so, who? No   Social/Functional History   Type of Home House   Home Layout Two level   Home Access Stairs to enter with [address]   Transfer Assistance Independent   Discharge Planning   Type of [address] Discharge   Transition of [address] ([address] [facility] Discharge None   Mode of Transport at Discharge   (family)     CM met with pt at bedside. Pt reported that he is independent with ADLs and ADLs and uses no DME. He reports no current concerns related to discharge. CM will continue to follow.      JOY HerculesW   CM, [id number]

## 2023-05-24 VITALS
DIASTOLIC BLOOD PRESSURE: 64 MMHG | HEIGHT: 67 IN | WEIGHT: 192 LBS | BODY MASS INDEX: 30.13 KG/M2 | SYSTOLIC BLOOD PRESSURE: 131 MMHG | HEART RATE: 81 BPM | OXYGEN SATURATION: 94 % | RESPIRATION RATE: 16 BRPM | TEMPERATURE: 99.3 F

## 2023-05-24 LAB
ALBUMIN SERPL-MCNC: 2.8 G/DL (ref 3.5–5)
ALBUMIN/GLOB SERPL: 0.9 (ref 1.1–2.2)
ALP SERPL-CCNC: 47 U/L (ref 45–117)
ALT SERPL-CCNC: 34 U/L (ref 12–78)
ANION GAP SERPL CALC-SCNC: 5 MMOL/L (ref 5–15)
AST SERPL-CCNC: 24 U/L (ref 15–37)
BASOPHILS # BLD: 0.1 K/UL (ref 0–0.1)
BASOPHILS NFR BLD: 1 % (ref 0–1)
BILIRUB SERPL-MCNC: 0.6 MG/DL (ref 0.2–1)
BUN SERPL-MCNC: 22 MG/DL (ref 6–20)
BUN/CREAT SERPL: 22 (ref 12–20)
CALCIUM SERPL-MCNC: 8.5 MG/DL (ref 8.5–10.1)
CHLORIDE SERPL-SCNC: 105 MMOL/L (ref 97–108)
CO2 SERPL-SCNC: 26 MMOL/L (ref 21–32)
CREAT SERPL-MCNC: 0.98 MG/DL (ref 0.7–1.3)
DIFFERENTIAL METHOD BLD: ABNORMAL
EOSINOPHIL # BLD: 0.2 K/UL (ref 0–0.4)
EOSINOPHIL NFR BLD: 2 % (ref 0–7)
ERYTHROCYTE [DISTWIDTH] IN BLOOD BY AUTOMATED COUNT: 14.8 % (ref 11.5–14.5)
GLOBULIN SER CALC-MCNC: 3 G/DL (ref 2–4)
GLUCOSE SERPL-MCNC: 125 MG/DL (ref 65–100)
HCT VFR BLD AUTO: 37.7 % (ref 36.6–50.3)
HGB BLD-MCNC: 12.6 G/DL (ref 12.1–17)
IMM GRANULOCYTES # BLD AUTO: 0.1 K/UL (ref 0–0.04)
IMM GRANULOCYTES NFR BLD AUTO: 1 % (ref 0–0.5)
LYMPHOCYTES # BLD: 0.8 K/UL (ref 0.8–3.5)
LYMPHOCYTES NFR BLD: 9 % (ref 12–49)
MAGNESIUM SERPL-MCNC: 2.1 MG/DL (ref 1.6–2.4)
MCH RBC QN AUTO: 30.7 PG (ref 26–34)
MCHC RBC AUTO-ENTMCNC: 33.4 G/DL (ref 30–36.5)
MCV RBC AUTO: 92 FL (ref 80–99)
MONOCYTES # BLD: 1 K/UL (ref 0–1)
MONOCYTES NFR BLD: 12 % (ref 5–13)
NEUTS SEG # BLD: 6.4 K/UL (ref 1.8–8)
NEUTS SEG NFR BLD: 75 % (ref 32–75)
NRBC # BLD: 0 K/UL (ref 0–0.01)
NRBC BLD-RTO: 0 PER 100 WBC
PLATELET # BLD AUTO: 186 K/UL (ref 150–400)
PMV BLD AUTO: 9.9 FL (ref 8.9–12.9)
POTASSIUM SERPL-SCNC: 3.5 MMOL/L (ref 3.5–5.1)
PROT SERPL-MCNC: 5.8 G/DL (ref 6.4–8.2)
RBC # BLD AUTO: 4.1 M/UL (ref 4.1–5.7)
SODIUM SERPL-SCNC: 136 MMOL/L (ref 136–145)
WBC # BLD AUTO: 8.6 K/UL (ref 4.1–11.1)

## 2023-05-24 PROCEDURE — 6370000000 HC RX 637 (ALT 250 FOR IP): Performed by: INTERNAL MEDICINE

## 2023-05-24 PROCEDURE — 2580000003 HC RX 258: Performed by: INTERNAL MEDICINE

## 2023-05-24 PROCEDURE — 83735 ASSAY OF MAGNESIUM: CPT

## 2023-05-24 PROCEDURE — 6360000002 HC RX W HCPCS: Performed by: INTERNAL MEDICINE

## 2023-05-24 PROCEDURE — 36415 COLL VENOUS BLD VENIPUNCTURE: CPT

## 2023-05-24 PROCEDURE — 2500000003 HC RX 250 WO HCPCS: Performed by: INTERNAL MEDICINE

## 2023-05-24 PROCEDURE — 85025 COMPLETE CBC W/AUTO DIFF WBC: CPT

## 2023-05-24 PROCEDURE — 80053 COMPREHEN METABOLIC PANEL: CPT

## 2023-05-24 RX ORDER — LANOLIN ALCOHOL/MO/W.PET/CERES
3 CREAM (GRAM) TOPICAL NIGHTLY PRN
Status: DISCONTINUED | OUTPATIENT
Start: 2023-05-24 | End: 2023-05-24 | Stop reason: HOSPADM

## 2023-05-24 RX ORDER — HYDRALAZINE HYDROCHLORIDE 20 MG/ML
10 INJECTION INTRAMUSCULAR; INTRAVENOUS EVERY 6 HOURS PRN
Status: DISCONTINUED | OUTPATIENT
Start: 2023-05-24 | End: 2023-05-24 | Stop reason: HOSPADM

## 2023-05-24 RX ORDER — MAGNESIUM HYDROXIDE/ALUMINUM HYDROXICE/SIMETHICONE 120; 1200; 1200 MG/30ML; MG/30ML; MG/30ML
30 SUSPENSION ORAL EVERY 6 HOURS PRN
Status: DISCONTINUED | OUTPATIENT
Start: 2023-05-24 | End: 2023-05-24 | Stop reason: HOSPADM

## 2023-05-24 RX ORDER — METRONIDAZOLE 500 MG/1
500 TABLET ORAL 3 TIMES DAILY
Qty: 6 TABLET | Refills: 0 | Status: SHIPPED | OUTPATIENT
Start: 2023-05-24 | End: 2023-05-24 | Stop reason: HOSPADM

## 2023-05-24 RX ORDER — HYDROXYZINE HYDROCHLORIDE 10 MG/1
10 TABLET, FILM COATED ORAL 3 TIMES DAILY PRN
Status: DISCONTINUED | OUTPATIENT
Start: 2023-05-24 | End: 2023-05-24 | Stop reason: HOSPADM

## 2023-05-24 RX ORDER — TRAMADOL HYDROCHLORIDE 50 MG/1
50 TABLET ORAL EVERY 6 HOURS PRN
Status: DISCONTINUED | OUTPATIENT
Start: 2023-05-24 | End: 2023-05-24 | Stop reason: HOSPADM

## 2023-05-24 RX ADMIN — METRONIDAZOLE 500 MG: 500 INJECTION, SOLUTION INTRAVENOUS at 02:54

## 2023-05-24 RX ADMIN — ASPIRIN 81 MG: 81 TABLET, COATED ORAL at 09:57

## 2023-05-24 RX ADMIN — METRONIDAZOLE 500 MG: 500 INJECTION, SOLUTION INTRAVENOUS at 14:36

## 2023-05-24 RX ADMIN — SODIUM CHLORIDE 1000 MG: 900 INJECTION INTRAVENOUS at 14:36

## 2023-05-24 RX ADMIN — FAMOTIDINE 40 MG: 20 TABLET ORAL at 09:57

## 2023-05-24 ASSESSMENT — PAIN SCALES - GENERAL: PAINLEVEL_OUTOF10: 0

## 2023-05-24 NOTE — DISCHARGE SUMMARY
Admit date: 5/21/2023   Admitting Provider: Giovani Beach DO    Discharge date: 5/24/2023  Discharging Provider: ARIAN Villarreal    * Discharge Diagnoses:    Principal Problem:    Colitis  Resolved Problems:    * No resolved hospital problems. [facility] Course:     Sj Canada is a 64-year-old male with a PMH of hyperlipidemia, rectal adenocarcinoma, and factor V Leiden who presented as a transfer from [facility] for possible infectious rectosigmoid colitis. There he was started on vancomycin and cefepime as well as fluid resuscitation. In OSH mildly hypotensive at 96/71 and [facility] vitals stable. Initial labs nonsignificant. CXR with no acute process. CT abdomen pelvis with colonic wall thickening involving the sigmoid colon and rectum with pericolonic fat stranding without evidence of perforation or fluid collections. Patient admitted for further management. Patient started on cefepime and vancomycin. GI and oncology consulted. Patient transitioned off of cefepime/vancomycin to ceftriaxone and flagyl. Stool studies negative. Colitis likely more a side effect of the cancer treatment, procalcitonin negative and stool studies negative. Patient to hold off on taking chemotherapy medications until 5/30. Patient to start taking probiotic and metamucil for bowel health. Patient to follow-up with PCP, oncology, and radiation oncology within 2 weeks from discharge. All questions answered at time of encounter. * Procedures:   * No surgery found *      Consults:   Radiation oncology, oncology, GI    Significant Diagnostic Studies: As discussed in hospital course    Discharge Exam:  /64   Pulse 81   Temp 99.3 °F (37.4 °C)   Resp 16   Ht 1.702 m (5' 7\")   Wt 87.1 kg (192 lb)   SpO2 94%   BMI 30.07 kg/m²     PHYSICAL EXAM:  Vitals and nursing note reviewed. HENT:      Head: Normocephalic and atraumatic. Cardiovascular:      Rate and Rhythm: Normal rate and regular rhythm.

## 2023-05-24 NOTE — PROGRESS NOTES
0530: Bedside shift change report given to ELISA Kaplan (oncoming nurse) by Vamsi Chino RN (offgoing nurse). Report included the following information Nurse Handoff Report. 0600: Assessment complete. Pt moves all extremities spontaneously, purposefully, and to command. Pt in NSR with palpable radial and pedal pulses bilaterally. Pt incontinent of urine and stool. Pt wearing diaper. Pt has active bowel sounds. Pt on RA with diminished lung sounds bilaterally. Pt cleaned of one incontinent urine and stool episode.
Emmanuel Morataya NP-MARLINE                       [phone] cell         GI PROGRESS NOTE        NAME:   Iris Collins       :    1945       MRN:    [id number]     Assessment/Plan     GI consultation for diarrhea and colitis on CT. 75 y/o male diagnosed with rectal adenocarcinoma earlier this year. Had resection but had positive margins and LN, chemo and radiation started 2023. Diarrhea started 4-5 days ago, fever 101.4. CT shows RS colitis. No antibiotic use or sick contacts. Small amount BRB in stool. Impression:  Rectal adenocarcinoma, currently having chemo and radiation  Diarrhea  RS colitis on CT  Rectal bleeding    2023: Standing in room eating breakfast. BM this morning, hoping to send stool sample for studies which are not yet collected. Follow for results of stools studies, continue antibiotics until infection ruled out. Other consideration is side effect of chemotherapy and/or radiation. Patient Active Problem List   Diagnosis    Convulsion ([facility] [address]    Altered mental status, unspecified    Colitis       Subjective:     Review of Systems: Per assessment    Objective:     VITALS:   Last 24hrs VS reviewed since prior hospitalist progress note. Most recent are:  Vitals:    23 0824   BP: (!) 130/56   Pulse: 67   Resp:    Temp:    SpO2:      No intake or output data in the 24 hours ending 23 1034     PHYSICAL EXAM:  General   well developed, well nourished, appears stated age, in no acute distress  EENT  Normocephalic, Atraumatic, PERRLA, EOMI, sclera clear  Respiratory   Clear To Auscultation bilaterally - no wheezes, rales, rhonchi, or crackles  Cardiology  Regular Rate and Rythmn  - no murmurs, rubs or gallops  Abdominal  Soft, non-tender, non-distended, positive bowel sounds, no hepatosplenomegaly, no palpable mass  Extremities  No clubbing, cyanosis, or edema.  Pulses
End of Shift Note    Bedside shift change report given to [address] [place] [address] (oncoming nurse) by Mitch Allen RN (offgoing nurse). Report included the following information SBAR, Kardex, Intake/Output, MAR, and Recent Results    Shift worked:  3a-7a     Shift summary and any significant changes:     Patient tolerated care. No complaints of pain. Pt ambulated to bathroom. Meds given. Unable to get stool sample as pt has not stooled. Hat placed in bathrooom.      Concerns for physician to address:       Zone phone for oncoming shift:              Mitch Allen, RN
End of Shift Note    Bedside shift change report given to Kathy PÉREZ (oncoming nurse) by Giancarlo Marie RN (offgoing nurse). Report included the following information SBAR, Kardex, Intake/Output, MAR, and Recent Results    Shift worked:  7a - 7p     Shift summary and any significant changes:    Patient tolerated care. No complaints of pain. Pt ambulated to bathroom. Meds given per MAR. Stool sample was collected and C-diff was not detected; Contact precautions discontinued. IV flushed during shift.       Concerns for physician to address:       Zone phone for oncoming shift:              Giancarlo Marie RN
End of Shift Note    Bedside shift change report given to [facility] (oncoming nurse) by Chung Baker RN (offgoing nurse). Report included the following information SBAR, Kardex, and MAR    Shift worked:  7pm to [employer] summary and any significant changes:     Patient tolerated care fairly. No complaints of pain. Patient cleaned due to incontinence throughout shift. Hourly rounding completed. Unable to obtain stool sample as patient is incontinent. Medications given and education provided regarding all meds.  Patient currently on enteric precautions as patient is a c-diff rule out           Chung Baker RN
Hem/onc consult called. Dr. Keara Vang will be by to see pt later this afternoon.   Updated pt and wife
Hospitalist Progress Note    Estimated discharge date: 5/24  Barriers: improvement in diarrhea/stool study results      Assessment / Plan:    Rectal adenocarcinoma  S/p resection on 2/2023 - residual margins that were positive as well as evidence of lymphovascular invasion  Recent chemo and XRT - hold while acutely ill  Oncology following    Diarrhea  DDX: rectosigmoid colitis vs side effect of chemo/XRT vs ischemic  Continue on IV ceftriaxone, flagyl, and IV pepcid  Supportive care - anti-nausea  Stool studies pending  GI following    Factor V leiden, POA  Not on blood thinners    Medical Decision Making:    Labs reviewed by myself: CBC/CMP    Diagnostic data reviewed by myself: CT abd/pelvis    Toxic drug monitoring: none    Care Plan discussed with: Patient/Family, Nurse, , and Other Dr. Raj Lenz    DVT Prophylaxis: SCDs  GI Prophylaxis: pepcid  Code Status: FULL      Subjective:     Daily Progress Note: 5/23/2023 8:02 AM    Patient is unclear if he was having diarrhea or \"foamy stools\" admits to blood in stool but says it is \"clear and gelatinous\". Patient is alert to self, place, and time but clearly shows memory loss.     Current Facility-Administered Medications   Medication Dose Route Frequency    sodium chloride flush 0.9 % injection 5-40 mL  5-40 mL IntraVENous 2 times per day    sodium chloride flush 0.9 % injection 5-40 mL  5-40 mL IntraVENous PRN    0.9 % sodium chloride infusion   IntraVENous PRN    ondansetron (ZOFRAN-ODT) disintegrating tablet 4 mg  4 mg Oral Q8H PRN    Or    ondansetron (ZOFRAN) injection 4 mg  4 mg IntraVENous Q6H PRN    acetaminophen (TYLENOL) tablet 650 mg  650 mg Oral Q6H PRN    Or    acetaminophen (TYLENOL) suppository 650 mg  650 mg Rectal Q6H PRN    cefTRIAXone (ROCEPHIN) 1,000 mg in sodium chloride 0.9 % 50 mL IVPB (mini-bag)  1,000 mg IntraVENous Q24H    metronidazole (FLAGYL) 500 mg in 0.9% NaCl 100 mL IVPB premix  500 mg IntraVENous Q12H    DULoxetine
Hospitalist Progress Note    NAME:   Bettie Morfin   : 1945   MRN: [id number]     Date/Time: 2023 12:32 PM    Patient PCP: Shirley Cifuentes MD    Estimated discharge date: 2023    Barriers: not medically stable    Assessment / Plan:    Rectosigmoid colitis POA  - Chemo and XRT for rectal adenocarcinoma.   + margins and lymphovascular invasion.  - Chemo and XRT starting 3 weeks ago, symptoms started 1 week ago   + fevers to 101  - Chest xray: no acute process  - WBC 6.3  - CT abdomen/pelvis read by radiology IMPRESSION:  Colonic wall thickening involving the sigmoid colon and rectum   +pericolonic fat stranding compatible with colitis. No evidence of perforation or fluid collections. - Differential   ? Infectious   ? Ischemic   ? Radiation    ? capecitabine (XELODA) can cause diarrhea  - IV ceftriaxone and flagyl empirically till infection ruled out  - Check stool studies   C difficile can occur with bacterial infections  - Oncology consulted: pt of Christina Ashford, I spoke with them  - Consult GI in AM    Rectal adenocarcinoma   - S/p resection with positive margins 2023  - Recent XRT and chemo  - Dr Marimar Gore following    Factor V leiden POA  - Not on blood thinners    Medical Decision Making:   I personally reviewed labs: yes  I personally reviewed imaging: CT abdomen  Toxic drug monitoring:   Discussed case with: Dr Marimar Gore re etiology and management of the colitis    Code Status:  Full code  DVT Prophylaxis:   GI Prophylaxis:    Subjective:     Chief Complaint / Reason for Physician Visit  \"The stool just comes out quickly\". Discussed with RN events overnight. Started XRT and chemo 2 weeks ago, diarrhea past 79 fays   One fever to 101.4 at home  Solid and watery component, hits him very quickly, wearing brief now   Occasional scant blood  No abdominal pain or N/V  No CP or SOB    Objective:     VITALS:   Last 24hrs VS reviewed since prior progress note.  Most recent are:  Patient Vitals for
I prayed with [place] and celebrated with him the anointing of the [address] I also gave him Communion.   Father Seb Velez
Patient stating he is getting dehydrated. He asked for the signs of this- I informed him. I told him he needed to drink more water if he feels dehydrated because all his labs are WNL. He asked if I would call the doctor and hooked him up with fluids. I did call the MD. She said no fluids because his labs are wnl and he should drink water. I informed  patient of this. He said he was an  and deals in facts. I told him labs being normal are a great indication, stated he should talk to his physicin when she rounds in the morning.   Will continue to monitor
Pharmacy Medication Reconciliation     The patient was interviewed regarding current PTA medication list, use and drug allergies;  patient present in room and obtained permission from patient to discuss drug regimen with visitor(s) present. The patient was questioned regarding use of any other inhalers, topical products, over the counter medications, herbal medications, vitamin products or ophthalmic/nasal/otic medication use. Allergy Update: Patient has no known allergies. Recommendations/Findings: The following amendments were made to the patient's active medication list on file at [facility]:   1) Additions: None    2) Deletions: Triamcinolone cream    3) Changes: Volataren gel 2g QID PRN --> 4g BID    Pertinent Findings: None    Clarified PTA med list with patient. PTA medication list was corrected to the following:     Prior to Admission Medications   Prescriptions Last Dose Informant Patient Reported? ALPRAZolam (XANAX) 0.5 MG tablet 5/20/2023 Self Yes   Sig: Take 1 tablet by mouth daily.    DULoxetine (CYMBALTA) 30 MG extended release capsule 5/20/2023 Self Yes   Sig: Take 2 capsules by mouth every evening   Emollient (CERAVE) CREA 5/20/2023 Self Yes   Sig: Apply topically as needed Topical moisturizer to arms   Emollient (UDDERLY SMOOTH) CREA  Self Yes   Sig: Apply topically 4 times daily as needed Apply topically to prevent dry skin from  chemotherapy treatments   Multiple Vitamin (DAILY VITES) TABS 5/20/2023 Self Yes   Sig: Take 1 tablet by mouth daily   Psyllium 100 % POWD 5/20/2023 Self Yes   Sig: Take 1 packet by mouth 3 times daily   aspirin 81 MG EC tablet 5/20/2023 Self Yes   Sig: Take 1 tablet by mouth daily   capecitabine (XELODA) 150 MG chemo tablet  Self Yes   Sig: Take 2 tablets (300 mg total) by mouth 2 times daily   capecitabine (XELODA) 500 MG chemo tablet 5/20/2023 Self Yes   Sig: Take 3 tablets by mouth 2 times daily   diclofenac sodium (VOLTAREN) 1 % GEL 5/20/2023 Self Yes   Sig: Apply
Pharmacy Medication Reconciliation     The patient was interviewed regarding current PTA medication list, use and drug allergies; his wife was present in room and obtained permission from patient to discuss drug regimen with visitor(s) present. The patient was questioned regarding use of any other inhalers, topical products, over the counter medications, herbal medications, vitamin products or ophthalmic/nasal/otic medication use. Allergy Update: Patient has no known allergies. Recommendations/Findings: The following amendments were made to the patient's active medication list on file at [facility]:   1) Additions:   Aquaphor  Cerave  Udderly Smooth    2) Deletions:   Percocet  Naproxen    3) Changes:   Pepcid 10mg daily to 40mg daily  Cymbalta 30mg to 60mg daily    PTA medication list was corrected to the following:     Prior to Admission Medications   Prescriptions Last Dose Informant Patient Reported? Taking? ALPRAZolam (XANAX) 0.5 MG tablet 5/20/2023  Yes No   Sig: Take by mouth.    DULoxetine (CYMBALTA) 30 MG extended release capsule 5/20/2023  Yes Yes   Sig: Take 2 capsules by mouth every evening   Magic Mouthwash (MIRACLE MOUTHWASH)   No No   Sig: Swish and spit 5 mLs 3 times daily as needed for Irritation   Multiple Vitamin (DAILY VITES) TABS 5/20/2023  Yes No   Sig: Take 1 tablet by mouth daily   Psyllium 100 % POWD 5/20/2023  Yes No   Sig: Take 1 packet by mouth 3 times daily   aspirin 81 MG EC tablet 5/20/2023  Yes No   Sig: Take by mouth daily   capecitabine (XELODA) 150 MG chemo tablet   Yes No   Sig: Take 2 tablets (300 mg total) by mouth 2 times daily   capecitabine (XELODA) 500 MG chemo tablet 5/20/2023  Yes No   Sig: Take 3 tablets by mouth 2 times daily   diclofenac sodium (VOLTAREN) 1 % GEL 5/20/2023  Yes No   Sig: APPLY 2 GM TO AFFECTED AREA FOUR TIMES A DAY AS NEEDED FOR PAIN   famotidine (PEPCID) 40 MG tablet 5/20/2023  Yes Yes   Sig: Take 1 tablet by mouth daily   ondansetron (ZOFRAN-ODT)
Pt discharging to home with wife. Pt left unit after discharge paperwork reviewed with Pt and wife. All questions answered. Pt left unit via wheelchair and staff escort.
Pt transferred via wheelchair to new room. Pt did not have a bowel movement to where this RN could collect it. Patient states, he feels like he is pushing out a big bowel movement then its like \"foam\" and disappears. Pt did have one very small episode of light beige/light pink substance that appeared to soak up in the diaper. The patient stated he is continent of bladder, but, sometimes has a hard time getting to the bathroom to have a BM d/t the frequency. I suggested he start getting up and ambulating to the restroom and/or urinal instead of using the diaper. Pt is very steady on his feet. Wife updated about new room number. Pt in no visible distress.
This patient has been undergoing radiation therapy to the pelvis in conjunction with concurrent chemotherapy and was last treated on May 19, 2023. He has now received 15 of planned 30 treatments to the pelvis for adenocarcinoma of the anorectal region. The patient gives a recent history of the onset of frequent diarrhea, hematochezia and fever which led to admission through the ED. Imaging demonstrates colitis of the sigmoid and rectum. Since admission, the patient's symptoms of frequent bowel movements and diarrhea have improved and he is currently receiving intravenous antibiotics for presumed infectious colitis. I discussed the situation yesterday with Dr. Charli Lockhart and we decided it would be best to hold off with radiation therapy and concurrent chemotherapy through this week with reevaluation on May 30 to determine if there has been sufficient recovery of his symptoms to reinitiate his treatment.
sclera clear  Respiratory   Clear To Auscultation bilaterally - no wheezes, rales, rhonchi, or crackles  Cardiology  Regular Rate and Rythmn  - no murmurs, rubs or gallops  Abdominal  Soft, non-tender, non-distended, positive bowel sounds, no hepatosplenomegaly, no palpable mass  Extremities  No clubbing, cyanosis, or edema. Pulses intact. Skin  Normal skin turgor. No rashes or skin ulcers noted  Neurological  No focal neurological deficits noted  Psychological  Oriented x 3. Normal affect. Lab Data   Recent Results (from the past 12 hour(s))   Comprehensive Metabolic Panel w/ Reflex to MG    Collection Time: 05/24/23  2:45 AM   Result Value Ref Range    Sodium 136 136 - 145 mmol/L    Potassium 3.5 3.5 - 5.1 mmol/L    Chloride 105 97 - 108 mmol/L    CO2 26 21 - 32 mmol/L    Anion Gap 5 5 - 15 mmol/L    Glucose 125 (H) 65 - 100 mg/dL    BUN 22 (H) 6 - 20 MG/DL    Creatinine 0.98 0.70 - 1.30 MG/DL    Bun/Cre Ratio 22 (H) 12 - 20      Est, Glom Filt Rate >60 >60 ml/min/1.73m2    Calcium 8.5 8.5 - 10.1 MG/DL    Total Bilirubin 0.6 0.2 - 1.0 MG/DL    ALT 34 12 - 78 U/L    AST 24 15 - 37 U/L    Alk Phosphatase 47 45 - 117 U/L    Total Protein 5.8 (L) 6.4 - 8.2 g/dL    Albumin 2.8 (L) 3.5 - 5.0 g/dL    Globulin 3.0 2.0 - 4.0 g/dL    Albumin/Globulin Ratio 0.9 (L) 1.1 - 2.2     CBC with Auto Differential    Collection Time: 05/24/23  2:45 AM   Result Value Ref Range    WBC 8.6 4.1 - 11.1 K/uL    RBC 4.10 4. 10 - 5.70 M/uL    Hemoglobin 12.6 12.1 - 17.0 g/dL    Hematocrit 37.7 36.6 - 50.3 %    MCV 92.0 80.0 - 99.0 FL    MCH 30.7 26.0 - 34.0 PG    MCHC 33.4 30.0 - 36.5 g/dL    RDW 14.8 (H) 11.5 - 14.5 %    Platelets 083 420 - 233 K/uL    MPV 9.9 8.9 - 12.9 FL    Nucleated RBCs 0.0 0  WBC    nRBC 0.00 0.00 - 0.01 K/uL    Neutrophils % 75 32 - 75 %    Lymphocytes % 9 (L) 12 - 49 %    Monocytes % 12 5 - 13 %    Eosinophils % 2 0 - 7 %    Basophils % 1 0 - 1 %    Immature Granulocytes 1 (H) 0.0 - 0.5 %

## 2023-05-30 ENCOUNTER — OFFICE VISIT (OUTPATIENT)
Age: 78
End: 2023-05-30
Payer: MEDICARE

## 2023-05-30 VITALS
OXYGEN SATURATION: 96 % | SYSTOLIC BLOOD PRESSURE: 98 MMHG | WEIGHT: 186 LBS | BODY MASS INDEX: 29.19 KG/M2 | HEART RATE: 88 BPM | HEIGHT: 67 IN | RESPIRATION RATE: 16 BRPM | DIASTOLIC BLOOD PRESSURE: 59 MMHG | TEMPERATURE: 98.5 F

## 2023-05-30 DIAGNOSIS — C20 MALIGNANT NEOPLASM OF RECTUM (HCC): Primary | ICD-10-CM

## 2023-05-30 DIAGNOSIS — C20 MALIGNANT NEOPLASM OF RECTUM (HCC): ICD-10-CM

## 2023-05-30 PROCEDURE — G8417 CALC BMI ABV UP PARAM F/U: HCPCS | Performed by: STUDENT IN AN ORGANIZED HEALTH CARE EDUCATION/TRAINING PROGRAM

## 2023-05-30 PROCEDURE — G8427 DOCREV CUR MEDS BY ELIG CLIN: HCPCS | Performed by: STUDENT IN AN ORGANIZED HEALTH CARE EDUCATION/TRAINING PROGRAM

## 2023-05-30 PROCEDURE — 99214 OFFICE O/P EST MOD 30 MIN: CPT | Performed by: STUDENT IN AN ORGANIZED HEALTH CARE EDUCATION/TRAINING PROGRAM

## 2023-05-30 PROCEDURE — 1111F DSCHRG MED/CURRENT MED MERGE: CPT | Performed by: STUDENT IN AN ORGANIZED HEALTH CARE EDUCATION/TRAINING PROGRAM

## 2023-05-30 PROCEDURE — 1124F ACP DISCUSS-NO DSCNMKR DOCD: CPT | Performed by: STUDENT IN AN ORGANIZED HEALTH CARE EDUCATION/TRAINING PROGRAM

## 2023-05-30 PROCEDURE — 1036F TOBACCO NON-USER: CPT | Performed by: STUDENT IN AN ORGANIZED HEALTH CARE EDUCATION/TRAINING PROGRAM

## 2023-05-30 RX ORDER — CIPROFLOXACIN 500 MG/1
500 TABLET, FILM COATED ORAL EVERY 12 HOURS
COMMUNITY
Start: 2023-05-27

## 2023-05-30 RX ORDER — DIPHENOXYLATE HYDROCHLORIDE AND ATROPINE SULFATE 2.5; .025 MG/1; MG/1
TABLET ORAL
COMMUNITY
Start: 2023-05-27

## 2023-05-30 RX ORDER — MONTELUKAST SODIUM 4 MG/1
TABLET, CHEWABLE ORAL
COMMUNITY
Start: 2023-05-25

## 2023-05-30 NOTE — PROGRESS NOTES
Aubrey Hair is a 76 y.o. male who presents for follow up of   Chief Complaint   Patient presents with    Follow-up     Malignant neoplasm of rectum       The patient reports no new clinical symptoms or new complaints since last clinic evaluation. Pt was in hospital 5/20/23. Until 05/24/23 for fever . Pt was having continuous diarrhea which prompted them to go. Pt continues to have diarrhea. Pt was given a ct scan in [place] which showed inflammation in his abdomen. Pt still continues to  run a fever at night. Pt reports fatigue and weakness. Pt reports a rash with tiny red bumps on both arms and left hand swelling since yesterday. Pt reports toes not looking right. No interval surgery or procedures reported    No reported new medication changes reported       Medications reviewed with the patient, and chart updated to reflect changes.
developed G4 severe diarrhea. He was hospitalized for suspected infectious colitis, however no infectious cause has been found. He continues with very frequent loose stools since hospital d/c 9 days ago. He is taking lomotil at times. Past Medical History:   Diagnosis Date    Arthritis     Factor V Leiden ([address]     High cholesterol     History of colon polyps        Past Surgical History:   Procedure Laterality Date    COLONOSCOPY N/A 1/10/2023    COLONOSCOPY performed by Montana Lama MD at [facility] ENDOSCOPY    GI      HEMORRHOID SURGERY      ORTHOPEDIC SURGERY      toe    TONSILLECTOMY      WISDOM TOOTH EXTRACTION         Social History     Socioeconomic History    Marital status:    Tobacco Use    Smoking status: Never    Smokeless tobacco: Never   Substance and Sexual Activity    Alcohol use: No    Drug use: Never       Current Outpatient Medications   Medication Sig Dispense Refill    ciprofloxacin (CIPRO) 500 MG tablet Take 1 tablet by mouth in the morning and 1 tablet in the evening. for 10 days.       colestipol (COLESTID) 1 g tablet TAKE 1-2 TABLETS ORALLY TWICE A DAY 30 DAY(S)      diphenoxylate-atropine (LOMOTIL) 2.5-0.025 MG per tablet TAKE 1 TABLET BY MOUTH FOUR TIMES A DAY AS NEEDED FOR DIARRHEA FOR 15 DAYS      famotidine (PEPCID) 40 MG tablet Take 1 tablet by mouth daily      DULoxetine (CYMBALTA) 30 MG extended release capsule Take 2 capsules by mouth every evening      Emollient (UDDERLY SMOOTH) CREA Apply topically 4 times daily as needed Apply topically to prevent dry skin from  chemotherapy treatments      mineral oil-hydrophilic petrolatum (AQUAPHOR) ointment Apply topically as needed for Dry Skin Prn to perinium      Emollient (CERAVE) CREA Apply topically as needed Topical moisturizer to arms      capecitabine (XELODA) 500 MG chemo tablet Take 3 tablets by mouth 2 times daily      prochlorperazine (COMPAZINE) 10 MG tablet Take 0.5 tablets by mouth every 6 hours as needed

## 2023-05-31 ENCOUNTER — CLINICAL DOCUMENTATION (OUTPATIENT)
Facility: HOSPITAL | Age: 78
End: 2023-05-31

## 2023-05-31 ENCOUNTER — TELEPHONE (OUTPATIENT)
Age: 78
End: 2023-05-31

## 2023-05-31 DIAGNOSIS — C20 MALIGNANT NEOPLASM OF RECTUM (HCC): Primary | ICD-10-CM

## 2023-05-31 LAB
ALBUMIN SERPL-MCNC: 2.6 G/DL (ref 3.5–5)
ALBUMIN/GLOB SERPL: 0.7 (ref 1.1–2.2)
ALP SERPL-CCNC: 63 U/L (ref 45–117)
ALT SERPL-CCNC: 92 U/L (ref 12–78)
ANION GAP SERPL CALC-SCNC: 8 MMOL/L (ref 5–15)
AST SERPL-CCNC: 67 U/L (ref 15–37)
BASOPHILS # BLD: 0.2 K/UL (ref 0–0.1)
BASOPHILS NFR BLD: 1 % (ref 0–1)
BILIRUB SERPL-MCNC: 0.4 MG/DL (ref 0.2–1)
BUN SERPL-MCNC: 15 MG/DL (ref 6–20)
BUN/CREAT SERPL: 16 (ref 12–20)
CALCIUM SERPL-MCNC: 8.1 MG/DL (ref 8.5–10.1)
CHLORIDE SERPL-SCNC: 101 MMOL/L (ref 97–108)
CO2 SERPL-SCNC: 28 MMOL/L (ref 21–32)
CREAT SERPL-MCNC: 0.96 MG/DL (ref 0.7–1.3)
DIFFERENTIAL METHOD BLD: ABNORMAL
EOSINOPHIL # BLD: 0.2 K/UL (ref 0–0.4)
EOSINOPHIL NFR BLD: 1 % (ref 0–7)
ERYTHROCYTE [DISTWIDTH] IN BLOOD BY AUTOMATED COUNT: 15.3 % (ref 11.5–14.5)
GLOBULIN SER CALC-MCNC: 3.6 G/DL (ref 2–4)
GLUCOSE SERPL-MCNC: 108 MG/DL (ref 65–100)
HCT VFR BLD AUTO: 36.6 % (ref 36.6–50.3)
HGB BLD-MCNC: 11.6 G/DL (ref 12.1–17)
IMM GRANULOCYTES # BLD AUTO: 0.2 K/UL (ref 0–0.04)
IMM GRANULOCYTES NFR BLD AUTO: 1 % (ref 0–0.5)
LYMPHOCYTES # BLD: 0.8 K/UL (ref 0.8–3.5)
LYMPHOCYTES NFR BLD: 5 % (ref 12–49)
MCH RBC QN AUTO: 29.9 PG (ref 26–34)
MCHC RBC AUTO-ENTMCNC: 31.7 G/DL (ref 30–36.5)
MCV RBC AUTO: 94.3 FL (ref 80–99)
MONOCYTES # BLD: 1.2 K/UL (ref 0–1)
MONOCYTES NFR BLD: 8 % (ref 5–13)
NEUTS SEG # BLD: 12.4 K/UL (ref 1.8–8)
NEUTS SEG NFR BLD: 84 % (ref 32–75)
NRBC # BLD: 0 K/UL (ref 0–0.01)
NRBC BLD-RTO: 0 PER 100 WBC
PLATELET # BLD AUTO: 222 K/UL (ref 150–400)
PMV BLD AUTO: 10.4 FL (ref 8.9–12.9)
POTASSIUM SERPL-SCNC: 4 MMOL/L (ref 3.5–5.1)
PROT SERPL-MCNC: 6.2 G/DL (ref 6.4–8.2)
RBC # BLD AUTO: 3.88 M/UL (ref 4.1–5.7)
RBC MORPH BLD: ABNORMAL
SODIUM SERPL-SCNC: 137 MMOL/L (ref 136–145)
WBC # BLD AUTO: 15 K/UL (ref 4.1–11.1)

## 2023-05-31 NOTE — PROGRESS NOTES
Called and spoke with Mr. Sai Urena. Labs after reviewed by Dr. Naomie Wilkinson appear that he is still dehydrated. Asked to continue to push fluids. Stated that his hand has gotten larger and called his PCP today who sent him a new ABX prescription. Stated that the diarrhea has started to slow down and is planning to come next Tuesday at 1045 to see Dr. Naomie Wilkinson.

## 2023-06-01 ENCOUNTER — TELEPHONE (OUTPATIENT)
Age: 78
End: 2023-06-01

## 2023-06-01 NOTE — TELEPHONE ENCOUNTER
Patient left VM on AM at [facility] office. Called phone number left of recording, was able to identify patient, HIPAA verified. Patient has muffled voice. He was able to put his wife on the phone. Mrs Angela Gilbert was very upset while on the phone. She stated patient is having diarrhea stools every hour, for the last 15 days. He is taking Lomotil 1 capsule Q 6 H and Imodium 1 cap Q 2 H. This is not helping. Mrs Angela Gilbert states she is overwhelmed, patient is unable to take care of himself, she is having to help him in the bathroom and she is mentally and physically exhausted. They are scheduled to see DR Compa Miranda, patients PCP today at 1330. Called Meka Cisneros RN for Dr Daxa Ledesma. She stated they are aware of the situation, and Frantz Ureña, Pharmacist is working with the patient and his wife.

## 2023-06-02 ENCOUNTER — TELEPHONE (OUTPATIENT)
Age: 78
End: 2023-06-02

## 2023-06-02 NOTE — TELEPHONE ENCOUNTER
----- Message from Charlotte Getachewkathia sent at 6/2/2023  9:51 AM EDT -----  Regarding: Update  Contact: [phone]  Dr. Courtney Jarrell,    This is [employer]. I wanted to let you know that my Dad has been admitted to [address] [place] [address]. We took him to the emergency room last night because he had become so weak and dehydrated that he was confused and couldn't walk. We haven't spoken with the doctors yet but my sister and Mom are back at the hospital after leaving the ER at about 1 am.  In addition to the continuous diarrhea and the blood clot in his hand, his toe may have an infection. We hope to learn more about what tests they have performed and their treatment plan today. I can be reached at [phone]. I will provide any updates that I get.     [employer]
